# Patient Record
Sex: FEMALE | Race: OTHER | HISPANIC OR LATINO
[De-identification: names, ages, dates, MRNs, and addresses within clinical notes are randomized per-mention and may not be internally consistent; named-entity substitution may affect disease eponyms.]

---

## 2020-01-20 PROBLEM — Z00.00 ENCOUNTER FOR PREVENTIVE HEALTH EXAMINATION: Status: ACTIVE | Noted: 2020-01-20

## 2020-01-22 ENCOUNTER — APPOINTMENT (OUTPATIENT)
Dept: ANTEPARTUM | Facility: CLINIC | Age: 30
End: 2020-01-22

## 2020-03-09 ENCOUNTER — ASOB RESULT (OUTPATIENT)
Age: 30
End: 2020-03-09

## 2020-03-09 ENCOUNTER — APPOINTMENT (OUTPATIENT)
Dept: ANTEPARTUM | Facility: CLINIC | Age: 30
End: 2020-03-09
Payer: MEDICAID

## 2020-03-09 PROCEDURE — 76820 UMBILICAL ARTERY ECHO: CPT

## 2020-03-09 PROCEDURE — 76816 OB US FOLLOW-UP PER FETUS: CPT

## 2020-03-09 PROCEDURE — 93976 VASCULAR STUDY: CPT

## 2020-03-16 ENCOUNTER — APPOINTMENT (OUTPATIENT)
Dept: ANTEPARTUM | Facility: CLINIC | Age: 30
End: 2020-03-16
Payer: MEDICAID

## 2020-03-16 ENCOUNTER — ASOB RESULT (OUTPATIENT)
Age: 30
End: 2020-03-16

## 2020-03-16 PROCEDURE — 76820 UMBILICAL ARTERY ECHO: CPT

## 2020-03-16 PROCEDURE — 93976 VASCULAR STUDY: CPT

## 2020-03-16 PROCEDURE — 76821 MIDDLE CEREBRAL ARTERY ECHO: CPT

## 2020-03-16 PROCEDURE — 76818 FETAL BIOPHYS PROFILE W/NST: CPT

## 2020-03-23 ENCOUNTER — APPOINTMENT (OUTPATIENT)
Dept: ANTEPARTUM | Facility: CLINIC | Age: 30
End: 2020-03-23
Payer: MEDICAID

## 2020-03-23 ENCOUNTER — ASOB RESULT (OUTPATIENT)
Age: 30
End: 2020-03-23

## 2020-03-23 PROCEDURE — 76819 FETAL BIOPHYS PROFIL W/O NST: CPT

## 2020-03-23 PROCEDURE — 93976 VASCULAR STUDY: CPT

## 2020-03-23 PROCEDURE — 76821 MIDDLE CEREBRAL ARTERY ECHO: CPT

## 2020-03-23 PROCEDURE — 76816 OB US FOLLOW-UP PER FETUS: CPT

## 2020-03-23 PROCEDURE — 76820 UMBILICAL ARTERY ECHO: CPT

## 2020-03-30 ENCOUNTER — ASOB RESULT (OUTPATIENT)
Age: 30
End: 2020-03-30

## 2020-03-30 ENCOUNTER — APPOINTMENT (OUTPATIENT)
Dept: ANTEPARTUM | Facility: CLINIC | Age: 30
End: 2020-03-30
Payer: MEDICAID

## 2020-03-30 DIAGNOSIS — Z34.90 ENCOUNTER FOR SUPERVISION OF NORMAL PREGNANCY, UNSPECIFIED, UNSPECIFIED TRIMESTER: ICD-10-CM

## 2020-03-30 PROCEDURE — 76820 UMBILICAL ARTERY ECHO: CPT

## 2020-03-30 PROCEDURE — 93976 VASCULAR STUDY: CPT

## 2020-03-30 PROCEDURE — 76819 FETAL BIOPHYS PROFIL W/O NST: CPT

## 2020-03-30 PROCEDURE — 76821 MIDDLE CEREBRAL ARTERY ECHO: CPT

## 2020-04-02 ENCOUNTER — APPOINTMENT (OUTPATIENT)
Dept: ANTEPARTUM | Facility: CLINIC | Age: 30
End: 2020-04-02

## 2020-04-06 ENCOUNTER — APPOINTMENT (OUTPATIENT)
Dept: ANTEPARTUM | Facility: CLINIC | Age: 30
End: 2020-04-06
Payer: MEDICAID

## 2020-04-06 ENCOUNTER — ASOB RESULT (OUTPATIENT)
Age: 30
End: 2020-04-06

## 2020-04-06 VITALS — TEMPERATURE: 98.8 F

## 2020-04-06 PROCEDURE — 76819 FETAL BIOPHYS PROFIL W/O NST: CPT

## 2020-04-06 PROCEDURE — 76820 UMBILICAL ARTERY ECHO: CPT

## 2020-04-06 PROCEDURE — 76821 MIDDLE CEREBRAL ARTERY ECHO: CPT

## 2020-04-06 PROCEDURE — 93976 VASCULAR STUDY: CPT

## 2020-04-06 PROCEDURE — 76816 OB US FOLLOW-UP PER FETUS: CPT

## 2020-04-09 ENCOUNTER — APPOINTMENT (OUTPATIENT)
Dept: ANTEPARTUM | Facility: CLINIC | Age: 30
End: 2020-04-09

## 2020-04-13 ENCOUNTER — APPOINTMENT (OUTPATIENT)
Dept: ANTEPARTUM | Facility: CLINIC | Age: 30
End: 2020-04-13

## 2020-04-13 ENCOUNTER — OUTPATIENT (OUTPATIENT)
Dept: INPATIENT UNIT | Facility: HOSPITAL | Age: 30
LOS: 1 days | End: 2020-04-13
Payer: COMMERCIAL

## 2020-04-13 VITALS
SYSTOLIC BLOOD PRESSURE: 121 MMHG | TEMPERATURE: 99 F | OXYGEN SATURATION: 97 % | HEART RATE: 98 BPM | RESPIRATION RATE: 22 BRPM | DIASTOLIC BLOOD PRESSURE: 73 MMHG

## 2020-04-13 VITALS
TEMPERATURE: 98 F | DIASTOLIC BLOOD PRESSURE: 79 MMHG | SYSTOLIC BLOOD PRESSURE: 113 MMHG | HEART RATE: 107 BPM | RESPIRATION RATE: 21 BRPM

## 2020-04-13 DIAGNOSIS — O47.03 FALSE LABOR BEFORE 37 COMPLETED WEEKS OF GESTATION, THIRD TRIMESTER: ICD-10-CM

## 2020-04-13 LAB — SARS-COV-2 RNA SPEC QL NAA+PROBE: DETECTED

## 2020-04-13 PROCEDURE — 59025 FETAL NON-STRESS TEST: CPT

## 2020-04-13 PROCEDURE — G0463: CPT

## 2020-04-13 PROCEDURE — 59050 FETAL MONITOR W/REPORT: CPT

## 2020-04-13 PROCEDURE — 87635 SARS-COV-2 COVID-19 AMP PRB: CPT

## 2020-04-13 RX ORDER — LABETALOL HCL 100 MG
100 TABLET ORAL ONCE
Refills: 0 | Status: DISCONTINUED | OUTPATIENT
Start: 2020-04-13 | End: 2020-04-13

## 2020-04-13 NOTE — OB PROVIDER TRIAGE NOTE - NSOBPROVIDERNOTE_OBGYN_ALL_OB_FT
31yo  at 32w6d by lmp c/w 2nd trim sono.  edc 20.  hx of iugr  referred by fetal surveillance unit for cough x 3 - to have covid testing    refers cough with phlegm for 3 days.  denies chills, fever or sick contact    Exam  ICU Vital Signs Last 24 Hrs  T(C): 37.2 (2020 17:45), Max: 37.2 (2020 17:45)  T(F): 99 (2020 17:45), Max: 99 (2020 17:45)  HR: 107 (2020 18:32) (98 - 107)  BP: 113/79 (2020 18:32) (113/79 - 121/73)  BP(mean): --  ABP: --  ABP(mean): --  RR: 22 (2020 17:45) (22 - 22)  SpO2: --    chest clear bilaterally  nst reactive  no contractions    A; r/o covid vs other resp viral illness    plan  discharge with supportive rx  covid testing done  will discharge and notify of results  understood precautions

## 2020-04-13 NOTE — OB RN TRIAGE NOTE - NS_TRIAGEADDITIONAL COMMENTS_OBGYN_ALL_OB_FT
o2 sat 97 RR -21 chest eval done by dr Han home with instructions given by dr han in Sinhala verbalized understnding

## 2020-04-16 ENCOUNTER — APPOINTMENT (OUTPATIENT)
Dept: ANTEPARTUM | Facility: CLINIC | Age: 30
End: 2020-04-16

## 2020-04-20 ENCOUNTER — APPOINTMENT (OUTPATIENT)
Dept: ANTEPARTUM | Facility: CLINIC | Age: 30
End: 2020-04-20

## 2020-04-23 ENCOUNTER — APPOINTMENT (OUTPATIENT)
Dept: ANTEPARTUM | Facility: CLINIC | Age: 30
End: 2020-04-23

## 2020-04-27 ENCOUNTER — APPOINTMENT (OUTPATIENT)
Dept: ANTEPARTUM | Facility: CLINIC | Age: 30
End: 2020-04-27

## 2020-04-30 ENCOUNTER — APPOINTMENT (OUTPATIENT)
Dept: ANTEPARTUM | Facility: CLINIC | Age: 30
End: 2020-04-30

## 2020-05-04 ENCOUNTER — APPOINTMENT (OUTPATIENT)
Dept: ANTEPARTUM | Facility: CLINIC | Age: 30
End: 2020-05-04

## 2020-05-07 ENCOUNTER — APPOINTMENT (OUTPATIENT)
Dept: ANTEPARTUM | Facility: CLINIC | Age: 30
End: 2020-05-07

## 2020-05-11 ENCOUNTER — APPOINTMENT (OUTPATIENT)
Dept: ANTEPARTUM | Facility: CLINIC | Age: 30
End: 2020-05-11
Payer: MEDICAID

## 2020-05-11 ENCOUNTER — ASOB RESULT (OUTPATIENT)
Age: 30
End: 2020-05-11

## 2020-05-11 VITALS — TEMPERATURE: 98.7 F

## 2020-05-11 PROCEDURE — 76821 MIDDLE CEREBRAL ARTERY ECHO: CPT

## 2020-05-11 PROCEDURE — 93976 VASCULAR STUDY: CPT

## 2020-05-11 PROCEDURE — 76820 UMBILICAL ARTERY ECHO: CPT

## 2020-05-11 PROCEDURE — 76816 OB US FOLLOW-UP PER FETUS: CPT

## 2020-05-11 PROCEDURE — 76819 FETAL BIOPHYS PROFIL W/O NST: CPT

## 2020-05-14 ENCOUNTER — APPOINTMENT (OUTPATIENT)
Dept: ANTEPARTUM | Facility: CLINIC | Age: 30
End: 2020-05-14

## 2020-05-18 ENCOUNTER — APPOINTMENT (OUTPATIENT)
Dept: ANTEPARTUM | Facility: CLINIC | Age: 30
End: 2020-05-18

## 2020-05-19 ENCOUNTER — APPOINTMENT (OUTPATIENT)
Dept: ANTEPARTUM | Facility: CLINIC | Age: 30
End: 2020-05-19
Payer: MEDICAID

## 2020-05-19 ENCOUNTER — ASOB RESULT (OUTPATIENT)
Age: 30
End: 2020-05-19

## 2020-05-19 PROCEDURE — 76821 MIDDLE CEREBRAL ARTERY ECHO: CPT | Mod: 59

## 2020-05-19 PROCEDURE — 76818 FETAL BIOPHYS PROFILE W/NST: CPT

## 2020-05-19 PROCEDURE — 76820 UMBILICAL ARTERY ECHO: CPT

## 2020-05-19 PROCEDURE — 93976 VASCULAR STUDY: CPT

## 2020-05-21 ENCOUNTER — APPOINTMENT (OUTPATIENT)
Dept: ANTEPARTUM | Facility: CLINIC | Age: 30
End: 2020-05-21

## 2020-05-22 ENCOUNTER — ASOB RESULT (OUTPATIENT)
Age: 30
End: 2020-05-22

## 2020-05-22 ENCOUNTER — APPOINTMENT (OUTPATIENT)
Dept: ANTEPARTUM | Facility: CLINIC | Age: 30
End: 2020-05-22
Payer: MEDICAID

## 2020-05-22 PROCEDURE — 76819 FETAL BIOPHYS PROFIL W/O NST: CPT

## 2020-05-26 ENCOUNTER — APPOINTMENT (OUTPATIENT)
Dept: ANTEPARTUM | Facility: CLINIC | Age: 30
End: 2020-05-26
Payer: MEDICAID

## 2020-05-26 ENCOUNTER — INPATIENT (INPATIENT)
Facility: HOSPITAL | Age: 30
LOS: 2 days | Discharge: HOME | End: 2020-05-29
Attending: OBSTETRICS & GYNECOLOGY | Admitting: OBSTETRICS & GYNECOLOGY
Payer: COMMERCIAL

## 2020-05-26 ENCOUNTER — ASOB RESULT (OUTPATIENT)
Age: 30
End: 2020-05-26

## 2020-05-26 VITALS
HEART RATE: 76 BPM | RESPIRATION RATE: 16 BRPM | SYSTOLIC BLOOD PRESSURE: 110 MMHG | DIASTOLIC BLOOD PRESSURE: 67 MMHG | HEIGHT: 64.57 IN | WEIGHT: 233.91 LBS | TEMPERATURE: 98 F

## 2020-05-26 DIAGNOSIS — O47.1 FALSE LABOR AT OR AFTER 37 COMPLETED WEEKS OF GESTATION: ICD-10-CM

## 2020-05-26 LAB
ABO RH CONFIRMATION: SIGNIFICANT CHANGE UP
BASOPHILS # BLD AUTO: 0.02 K/UL — SIGNIFICANT CHANGE UP (ref 0–0.2)
BASOPHILS NFR BLD AUTO: 0.2 % — SIGNIFICANT CHANGE UP (ref 0–2)
BLD GP AB SCN SERPL QL: SIGNIFICANT CHANGE UP
EOSINOPHIL # BLD AUTO: 0.11 K/UL — SIGNIFICANT CHANGE UP (ref 0–0.5)
EOSINOPHIL NFR BLD AUTO: 1.1 % — SIGNIFICANT CHANGE UP (ref 0–6)
HCT VFR BLD CALC: 35.5 % — SIGNIFICANT CHANGE UP (ref 34.5–45)
HGB BLD-MCNC: 11.6 G/DL — SIGNIFICANT CHANGE UP (ref 11.5–15.5)
IMM GRANULOCYTES NFR BLD AUTO: 0.3 % — SIGNIFICANT CHANGE UP (ref 0–1.5)
LYMPHOCYTES # BLD AUTO: 2.34 K/UL — SIGNIFICANT CHANGE UP (ref 1–3.3)
LYMPHOCYTES # BLD AUTO: 22.5 % — SIGNIFICANT CHANGE UP (ref 13–44)
MCHC RBC-ENTMCNC: 27.7 PG — SIGNIFICANT CHANGE UP (ref 27–34)
MCHC RBC-ENTMCNC: 32.7 GM/DL — SIGNIFICANT CHANGE UP (ref 32–36)
MCV RBC AUTO: 84.7 FL — SIGNIFICANT CHANGE UP (ref 80–100)
MONOCYTES # BLD AUTO: 0.55 K/UL — SIGNIFICANT CHANGE UP (ref 0–0.9)
MONOCYTES NFR BLD AUTO: 5.3 % — SIGNIFICANT CHANGE UP (ref 2–14)
NEUTROPHILS # BLD AUTO: 7.34 K/UL — SIGNIFICANT CHANGE UP (ref 1.8–7.4)
NEUTROPHILS NFR BLD AUTO: 70.6 % — SIGNIFICANT CHANGE UP (ref 43–77)
PLATELET # BLD AUTO: 257 K/UL — SIGNIFICANT CHANGE UP (ref 150–400)
RBC # BLD: 4.19 M/UL — SIGNIFICANT CHANGE UP (ref 3.8–5.2)
RBC # FLD: 14.1 % — SIGNIFICANT CHANGE UP (ref 10.3–14.5)
SARS-COV-2 RNA SPEC QL NAA+PROBE: SIGNIFICANT CHANGE UP
WBC # BLD: 10.39 K/UL — SIGNIFICANT CHANGE UP (ref 3.8–10.5)
WBC # FLD AUTO: 10.39 K/UL — SIGNIFICANT CHANGE UP (ref 3.8–10.5)

## 2020-05-26 PROCEDURE — 76819 FETAL BIOPHYS PROFIL W/O NST: CPT

## 2020-05-26 PROCEDURE — 93976 VASCULAR STUDY: CPT

## 2020-05-26 PROCEDURE — 76820 UMBILICAL ARTERY ECHO: CPT

## 2020-05-26 PROCEDURE — 76816 OB US FOLLOW-UP PER FETUS: CPT

## 2020-05-26 PROCEDURE — 76821 MIDDLE CEREBRAL ARTERY ECHO: CPT

## 2020-05-26 RX ORDER — SODIUM CHLORIDE 9 MG/ML
1000 INJECTION, SOLUTION INTRAVENOUS
Refills: 0 | Status: DISCONTINUED | OUTPATIENT
Start: 2020-05-26 | End: 2020-05-27

## 2020-05-26 RX ORDER — CITRIC ACID/SODIUM CITRATE 300-500 MG
30 SOLUTION, ORAL ORAL ONCE
Refills: 0 | Status: COMPLETED | OUTPATIENT
Start: 2020-05-26 | End: 2020-05-27

## 2020-05-26 RX ORDER — OXYTOCIN 10 UNIT/ML
333.33 VIAL (ML) INJECTION
Qty: 20 | Refills: 0 | Status: COMPLETED | OUTPATIENT
Start: 2020-05-26 | End: 2020-05-27

## 2020-05-26 RX ADMIN — SODIUM CHLORIDE 125 MILLILITER(S): 9 INJECTION, SOLUTION INTRAVENOUS at 21:31

## 2020-05-26 NOTE — OB RN PATIENT PROFILE - LANGUAGE ASSISTANCE NEEDED
Phone call to patient to convey results.   Left message for a call back    ROD Zhong, RN/Yes-Patient/Caregiver accepts free interpretation services...

## 2020-05-26 NOTE — OB PROVIDER H&P - NSHPREVIEWOFSYSTEMS_GEN_ALL_CORE
Constitutional: no fever, sweats, and no chills.  Eyes: no pain, no redness, and no visual changes.  ENMT: no nasal congestion/drainage, no dysphagia, and no throat pain, no neck pain, no stiffness  CV: no chest pain, no edema.  Resp: no cough, no dyspnea  GI: no abdominal pain, no bloating, no constipation, no diarrhea, no nausea and no vomiting.  : no dysuria, no hematuria  MSK: no msk pain, no weakness  Skin: no lesions, and no rashes.  Neuro: no headache, no weakness.

## 2020-05-26 NOTE — OB PROVIDER H&P - NSHPPHYSICALEXAM_GEN_ALL_CORE
General: Alert and oriented x3, no acute distress  HEENT: normocephalic, atraumatic, MMM  Heart: normal rate, no edema, not cyanotic  Lungs: nonlabored respirations, no use of accessory muscles  Abdomen: soft, gravid, nontender, no rebound tenderness, no guarding, nonperitonitic  Pelvic: 2cm dilation/50% effacement/-2 stage  FHT: baseline  bpm, moderate variability, +accels, -deccels  [place]: no contractions  Ultrasound: vertex presentation, anterior placenta

## 2020-05-26 NOTE — OB PROVIDER H&P - ATTENDING COMMENTS
30 y.o. F  39wk presenting for IOL for severe IUGR <1%ile  sent from [facility] office today  GBS neg   fetal tracing cat 1  start IOL   consent obtained   high risk for Hemorrhage

## 2020-05-26 NOTE — OB PROVIDER H&P - HISTORY OF PRESENT ILLNESS
Pt is a 30 y.o. F  39wk presenting from clinic for IUGR. The pt states she was referred because her infant was below the 1st percentile for weight. Normal fetal movements noted by mother, no contractions. Denies vaginal bleeding or leakage of fluids. Denies abdominal pain, nausea, vomiting, headache, fever, sweats, chills, chest pain, shortness of breath. Follows with [facility] clinic. Rubella nonimmune, COVID+ dx 6 weeks ago and symptoms resolved 4 weeks ago, GBS-.     PMH: None  PSH: None  Meds: PNV  Allergies: NKDA  SH: Denies smoking, drinking, or illicit/recreational drug use, lives with brother, mother, and children, feels safe at home.

## 2020-05-26 NOTE — CHART NOTE - NSCHARTNOTEFT_GEN_A_CORE
05-26-20 @ 23:03  Patient was evaluated at bedside.  Patient resting comfortably. No acute complaints at this time.    ICU Vital Signs Last 24 Hrs  T(C): 36.8 (26 May 2020 20:58), Max: 36.8 (26 May 2020 20:50)  T(F): 98.24 (26 May 2020 20:58), Max: 98.24 (26 May 2020 20:58)  HR: 76 (26 May 2020 20:59) (76 - 76)  BP: 110/67 (26 May 2020 20:59) (110/67 - 110/67)  RR: 16 (26 May 2020 20:50) (16 - 16)      FHT: 135 bpm, moderate variability + accels no decels noted - category I tracing  [place]: No contractions noted  SVE: 2.0/50/-2      PLAN:  Reactive tracing. No contractions noted on toco  1 dose of vaginal cytotec administered. Patient tolerated procedure well   Continuous FHT/[place]  Maternal/fetal status reassuring  Will continue to reassess prn.

## 2020-05-26 NOTE — OB PROVIDER H&P - ASSESSMENT
Pt is a 30 y.o. F  39wk presenting for IOL. Referred from clinic for IUGR, <1st percentile for weight. Rubella nonimmune, COVID+ 6 weeks ago but asymptomatic, GBS-.     Plan    - Admission labs: CBC, type and screen, ABO confirmation, RPR, COVID  - IV fluids  - Pain management: consider epidural  - Continuous FHT and Tocometry  - Plan for induction      d/w Dr. Bains

## 2020-05-27 ENCOUNTER — TRANSCRIPTION ENCOUNTER (OUTPATIENT)
Age: 30
End: 2020-05-27

## 2020-05-27 ENCOUNTER — RESULT REVIEW (OUTPATIENT)
Age: 30
End: 2020-05-27

## 2020-05-27 LAB — T PALLIDUM AB TITR SER: NEGATIVE — SIGNIFICANT CHANGE UP

## 2020-05-27 PROCEDURE — 88307 TISSUE EXAM BY PATHOLOGIST: CPT | Mod: 26

## 2020-05-27 RX ORDER — IBUPROFEN 200 MG
1 TABLET ORAL
Qty: 30 | Refills: 0
Start: 2020-05-27

## 2020-05-27 RX ORDER — ACETAMINOPHEN 500 MG
975 TABLET ORAL
Refills: 0 | Status: DISCONTINUED | OUTPATIENT
Start: 2020-05-27 | End: 2020-05-29

## 2020-05-27 RX ORDER — BENZOCAINE 10 %
1 GEL (GRAM) MUCOUS MEMBRANE EVERY 6 HOURS
Refills: 0 | Status: DISCONTINUED | OUTPATIENT
Start: 2020-05-27 | End: 2020-05-29

## 2020-05-27 RX ORDER — SIMETHICONE 80 MG/1
1 TABLET, CHEWABLE ORAL
Qty: 0 | Refills: 0 | DISCHARGE
Start: 2020-05-27

## 2020-05-27 RX ORDER — KETOROLAC TROMETHAMINE 30 MG/ML
30 SYRINGE (ML) INJECTION ONCE
Refills: 0 | Status: DISCONTINUED | OUTPATIENT
Start: 2020-05-27 | End: 2020-05-27

## 2020-05-27 RX ORDER — DIBUCAINE 1 %
1 OINTMENT (GRAM) RECTAL EVERY 6 HOURS
Refills: 0 | Status: DISCONTINUED | OUTPATIENT
Start: 2020-05-27 | End: 2020-05-29

## 2020-05-27 RX ORDER — ACETAMINOPHEN 500 MG
1 TABLET ORAL
Qty: 30 | Refills: 0
Start: 2020-05-27 | End: 2020-06-05

## 2020-05-27 RX ORDER — PRAMOXINE HYDROCHLORIDE 150 MG/15G
1 AEROSOL, FOAM RECTAL EVERY 4 HOURS
Refills: 0 | Status: DISCONTINUED | OUTPATIENT
Start: 2020-05-27 | End: 2020-05-29

## 2020-05-27 RX ORDER — HYDROCORTISONE 1 %
1 OINTMENT (GRAM) TOPICAL EVERY 6 HOURS
Refills: 0 | Status: DISCONTINUED | OUTPATIENT
Start: 2020-05-27 | End: 2020-05-29

## 2020-05-27 RX ORDER — OXYTOCIN 10 UNIT/ML
333.33 VIAL (ML) INJECTION
Qty: 20 | Refills: 0 | Status: DISCONTINUED | OUTPATIENT
Start: 2020-05-27 | End: 2020-05-29

## 2020-05-27 RX ORDER — OXYCODONE HYDROCHLORIDE 5 MG/1
5 TABLET ORAL ONCE
Refills: 0 | Status: DISCONTINUED | OUTPATIENT
Start: 2020-05-27 | End: 2020-05-29

## 2020-05-27 RX ORDER — SIMETHICONE 80 MG/1
80 TABLET, CHEWABLE ORAL EVERY 4 HOURS
Refills: 0 | Status: DISCONTINUED | OUTPATIENT
Start: 2020-05-27 | End: 2020-05-29

## 2020-05-27 RX ORDER — POLYETHYLENE GLYCOL 3350 17 G/17G
17 POWDER, FOR SOLUTION ORAL
Qty: 119 | Refills: 0
Start: 2020-05-27 | End: 2020-06-02

## 2020-05-27 RX ORDER — SODIUM CHLORIDE 9 MG/ML
3 INJECTION INTRAMUSCULAR; INTRAVENOUS; SUBCUTANEOUS EVERY 8 HOURS
Refills: 0 | Status: DISCONTINUED | OUTPATIENT
Start: 2020-05-27 | End: 2020-05-29

## 2020-05-27 RX ORDER — LANOLIN
1 OINTMENT (GRAM) TOPICAL EVERY 6 HOURS
Refills: 0 | Status: DISCONTINUED | OUTPATIENT
Start: 2020-05-27 | End: 2020-05-29

## 2020-05-27 RX ORDER — FERROUS SULFATE 325(65) MG
1 TABLET ORAL
Qty: 60 | Refills: 0
Start: 2020-05-27 | End: 2020-06-25

## 2020-05-27 RX ORDER — OXYCODONE HYDROCHLORIDE 5 MG/1
5 TABLET ORAL
Refills: 0 | Status: DISCONTINUED | OUTPATIENT
Start: 2020-05-27 | End: 2020-05-29

## 2020-05-27 RX ORDER — DIPHENHYDRAMINE HCL 50 MG
25 CAPSULE ORAL EVERY 6 HOURS
Refills: 0 | Status: DISCONTINUED | OUTPATIENT
Start: 2020-05-27 | End: 2020-05-29

## 2020-05-27 RX ORDER — IBUPROFEN 200 MG
600 TABLET ORAL EVERY 6 HOURS
Refills: 0 | Status: DISCONTINUED | OUTPATIENT
Start: 2020-05-27 | End: 2020-05-29

## 2020-05-27 RX ORDER — MAGNESIUM HYDROXIDE 400 MG/1
30 TABLET, CHEWABLE ORAL
Refills: 0 | Status: DISCONTINUED | OUTPATIENT
Start: 2020-05-27 | End: 2020-05-29

## 2020-05-27 RX ORDER — AER TRAVELER 0.5 G/1
1 SOLUTION RECTAL; TOPICAL EVERY 4 HOURS
Refills: 0 | Status: DISCONTINUED | OUTPATIENT
Start: 2020-05-27 | End: 2020-05-29

## 2020-05-27 RX ORDER — IBUPROFEN 200 MG
600 TABLET ORAL EVERY 6 HOURS
Refills: 0 | Status: COMPLETED | OUTPATIENT
Start: 2020-05-27 | End: 2021-04-25

## 2020-05-27 RX ORDER — TETANUS TOXOID, REDUCED DIPHTHERIA TOXOID AND ACELLULAR PERTUSSIS VACCINE, ADSORBED 5; 2.5; 8; 8; 2.5 [IU]/.5ML; [IU]/.5ML; UG/.5ML; UG/.5ML; UG/.5ML
0.5 SUSPENSION INTRAMUSCULAR ONCE
Refills: 0 | Status: DISCONTINUED | OUTPATIENT
Start: 2020-05-27 | End: 2020-05-29

## 2020-05-27 RX ORDER — OXYTOCIN 10 UNIT/ML
2 VIAL (ML) INJECTION
Qty: 30 | Refills: 0 | Status: DISCONTINUED | OUTPATIENT
Start: 2020-05-27 | End: 2020-05-29

## 2020-05-27 RX ADMIN — Medication 1000 MILLIUNIT(S)/MIN: at 13:04

## 2020-05-27 RX ADMIN — SODIUM CHLORIDE 125 MILLILITER(S): 9 INJECTION, SOLUTION INTRAVENOUS at 06:10

## 2020-05-27 RX ADMIN — Medication 600 MILLIGRAM(S): at 18:32

## 2020-05-27 RX ADMIN — Medication 30 MILLIGRAM(S): at 14:22

## 2020-05-27 RX ADMIN — Medication 2 MILLIUNIT(S)/MIN: at 07:49

## 2020-05-27 RX ADMIN — Medication 30 MILLILITER(S): at 12:09

## 2020-05-27 NOTE — OB RN DELIVERY SUMMARY - NS_SEPSISRSKCALC_OBGYN_ALL_OB_FT
EOS calculated successfully. EOS Risk Factor: 0.5/1000 live births ([facility] national incidence); GA=39w1d; Temp=98.78; ROM=0.117; GBS='Negative'; Antibiotics='No antibiotics or any antibiotics < 2 hrs prior to birth'

## 2020-05-27 NOTE — OB RN DELIVERY SUMMARY - BABY A: APGAR 5 MIN COLOR, DELIVERY
[General Appearance - Well Developed] : well developed [Normal Appearance] : normal appearance [Well Groomed] : well groomed [General Appearance - Well Nourished] : well nourished [General Appearance - In No Acute Distress] : no acute distress [No Deformities] : no deformities [Normal Conjunctiva] : the conjunctiva exhibited no abnormalities [Eyelids - No Xanthelasma] : the eyelids demonstrated no xanthelasmas [Normal Oral Mucosa] : normal oral mucosa [No Oral Pallor] : no oral pallor [No Oral Cyanosis] : no oral cyanosis [Heart Rate And Rhythm] : heart rate and rhythm were normal [Heart Sounds] : normal S1 and S2 [Murmurs] : no murmurs present [Arterial Pulses Normal] : the arterial pulses were normal [Edema] : no peripheral edema present [Exaggerated Use Of Accessory Muscles For Inspiration] : no accessory muscle use [Respiration, Rhythm And Depth] : normal respiratory rhythm and effort [Auscultation Breath Sounds / Voice Sounds] : lungs were clear to auscultation bilaterally [Abdomen Soft] : soft [Abdomen Tenderness] : non-tender [Abdomen Mass (___ Cm)] : no abdominal mass palpated [Abnormal Walk] : normal gait [Gait - Sufficient For Exercise Testing] : the gait was sufficient for exercise testing [Cyanosis, Localized] : no localized cyanosis [Nail Clubbing] : no clubbing of the fingernails [Petechial Hemorrhages (___cm)] : no petechial hemorrhages [] : no ischemic changes [Impaired Insight] : insight and judgment were intact [Oriented To Time, Place, And Person] : oriented to person, place, and time [Affect] : the affect was normal [Mood] : the mood was normal [No Anxiety] : not feeling anxious [FreeTextEntry1] : no JVD or bruits  (1) body pink, extremities blue

## 2020-05-27 NOTE — CHART NOTE - NSCHARTNOTEFT_GEN_A_CORE
I have reviewed patient's medical and obstetrical hx  Multip with IUP @ 85clj1p with FGR 1%  Cat 2 tracing minimal to moderate variability, 10x10 accels, no decels  [place]: irregular  -continue current mgmt  -start pitocin  ppalos

## 2020-05-27 NOTE — DISCHARGE NOTE OB - MEDICATION SUMMARY - MEDICATIONS TO TAKE
I will START or STAY ON the medications listed below when I get home from the hospital:    acetaminophen 650 mg oral tablet, extended release  -- 1 tab(s) by mouth every 8 hours MDD:4  -- This product contains acetaminophen.  Do not use  with any other product containing acetaminophen to prevent possible liver damage.    -- Indication: For PAIN    ibuprofen 600 mg oral tablet  -- 1 tab(s) by mouth every 6 hours MDD:4  -- Do not take this drug if you are pregnant.  It is very important that you take or use this exactly as directed.  Do not skip doses or discontinue unless directed by your doctor.  May cause drowsiness or dizziness.  Obtain medical advice before taking any non-prescription drugs as some may affect the action of this medication.  Take with food or milk.    -- Indication: For PAIN    ferrous sulfate 325 mg (65 mg elemental iron) oral delayed release tablet  -- 1 tab(s) by mouth 2 times a day   -- May discolor urine or feces.  Swallow whole.  Do not crush.    -- Indication: For ANEMIA    Prena1 oral capsule  -- 1 cap(s) by mouth once a day  -- Indication: For GENERAL HEALTH    MiraLax oral powder for reconstitution  -- 17 gram(s) by mouth once a day   -- Dilute this medication with liquid before administration.  It is very important that you take or use this exactly as directed.  Do not skip doses or discontinue unless directed by your doctor.    -- Indication: For CONSTIPATION    simethicone 80 mg oral tablet, chewable  -- 1 tab(s) by mouth every 4 hours, As needed, Gas  -- Indication: For GAS RELIEF

## 2020-05-27 NOTE — CHART NOTE - NSCHARTNOTEFT_GEN_A_CORE
05-27-20 @ 03:44  Patient was evaluated at bedside.  Patient reporting mild abdominal pain with contractions; however, does not want epidural at this time.    ICU Vital Signs Last 24 Hrs  T(C): 36.6 (27 May 2020 00:19), Max: 36.8 (26 May 2020 20:50)  T(F): 97.88 (27 May 2020 00:19), Max: 98.24 (26 May 2020 20:58)  HR: 68 (27 May 2020 03:38) (68 - 76)  BP: 112/67 (27 May 2020 03:38) (109/72 - 112/67)  RR: 16 (26 May 2020 20:50) (16 - 16)      FHT: 135 bpm,  - category I tracing  [place]: Irregular  SVE: 2.0/60/-2      PLAN:  Reactive tracing with periods of minimal variability. Will provide 500cc bolus of LR  Cervical exam unchanged; will proceed with vaginal cyotec (now s/p 25mcg x2)  Continuous FHT/[place]  Maternal/fetal status reassuring  Will continue to reassess prn. 05-27-20 @ 03:44  Patient was evaluated at bedside.  Patient reporting mild abdominal pain with contractions; however, does not want epidural at this time.    ICU Vital Signs Last 24 Hrs  T(C): 36.6 (27 May 2020 00:19), Max: 36.8 (26 May 2020 20:50)  T(F): 97.88 (27 May 2020 00:19), Max: 98.24 (26 May 2020 20:58)  HR: 68 (27 May 2020 03:38) (68 - 76)  BP: 112/67 (27 May 2020 03:38) (109/72 - 112/67)  RR: 16 (26 May 2020 20:50) (16 - 16)      FHT: 135 bpm, moderate variability + accels no decels present  [place]: Irregular  SVE: 2.0/60/-2      PLAN:  Reactive tracing with periods of minimal variability. Will provide 500cc bolus of LR  Cervical exam unchanged; will proceed with vaginal cyotec (now s/p 25mcg x2)  Continuous FHT/[place]  Maternal/fetal status reassuring  Will continue to reassess prn.

## 2020-05-27 NOTE — DISCHARGE NOTE OB - PATIENT PORTAL LINK FT
You can access the FollowMyHealth Patient Portal offered by [facility] by registering at the following website: [email or web address]. By joining [facility] [employer]’s FollowMyHealth portal, you will also be able to view your health information using other applications (apps) compatible with our system.

## 2020-05-27 NOTE — DISCHARGE NOTE OB - HOSPITAL COURSE
She is a 31 yo now  who presented at 39wks GA for an induction of labor for fetal growth restriction (1%-tile) and had a normal delivery. She had a normal postpartum course and was discharged home in stable condition on postpartum day 1.

## 2020-05-27 NOTE — CHART NOTE - NSCHARTNOTEFT_GEN_A_CORE
Attending note    S: patient reports mild pain with contractions    O: VSS per mat flow sheet    FHT: Cat 2 for minima to moderate variability, 10x10 accels, no decels  [place]: unable to trace    pit: 8mu  VE: 4/50/-3    A/P: Multip with IUP @ 56hmu4p admitted for IOL due to FGR 1%. Maternal/fetal status reassuring    -continue current mgmt  -reassess in 1-2hrs or prn    ppalos

## 2020-05-27 NOTE — CHART NOTE - NSCHARTNOTEFT_GEN_A_CORE
Patient is having an iol for FGR.     SVE: 3/70/-2  FHT: baseline 140bpm, moderate variability, -accels, -deccels  [place]: no contractions    a/p  Patient is having an iol.   - d/c vaginal cytotec  - start pitocin

## 2020-05-27 NOTE — DISCHARGE NOTE OB - CARE PROVIDER_API CALL
Bushra Milligan  OBSTETRICS AND GYNECOLOGY  [address] [place] [address]  Phone: [phone]  Fax: [phone]  Follow Up Time:

## 2020-05-27 NOTE — DISCHARGE NOTE OB - CARE PLAN
Principal Discharge DX:	Normal delivery at term  Goal:	Rapid recovery  Assessment and plan of treatment:	Patient should transition to regular activity level. Resume regular diet. Patient should follow up with her OB for a postpartum checkup 6 weeks after delivery. Patient should call her doctor sooner if she develops a fever or uncontrolled vaginal bleeding or fevers. Please call sooner if there are any other concerns.

## 2020-05-27 NOTE — OB PROVIDER DELIVERY SUMMARY - NSPROVIDERDELIVERYNOTE_OBGYN_ALL_OB_FT
Vaginal Delivery Summary    Procedure: Normal spontaneous vaginal delivery   Findings: Viable female infant delivered in cephalic presentation at 1304, placenta delivered at 1308  Apgar scores 9/9   Weight 2085 g  Laceration(s): 1st degree perineal  Repair: 3-0 vicryl on SH  EBL: 150 cc  Complications: FGR infant,     Procedure:   Patient felt rectal pressure and was found to be fully dilated, +2 station. She pushed effectively for 5 minutes. She delivered a viable female infant. Delayed cord clamping was completed for 30 seconds. Placenta delivered intact and pitocin was started at the delivery of infant. Perineum and vagina were inspected, first degree laceration(s) present. Adequate hemostasis was obtained. Vaginal Delivery Summary    Procedure: Normal spontaneous vaginal delivery   Findings: Viable female infant delivered in cephalic presentation at 1304, placenta delivered at 1308  Apgar scores 9/9   Weight 2085 g  Laceration(s): 1st degree perineal  Repair: 3-0 vicryl on SH  EBL: 150 cc  Complications: FGR infant,     Procedure:   Patient felt rectal pressure and was found to be fully dilated, +2 station. She pushed effectively for 5 minutes. She delivered a viable female infant. Delayed cord clamping was completed for 30 seconds. Placenta delivered intact and pitocin was started at the delivery of infant. Perineum and vagina were inspected, first degree laceration(s) present. Adequate hemostasis was obtained.    agree with above  I was present throughout entire delivery  ppalos

## 2020-05-28 ENCOUNTER — APPOINTMENT (OUTPATIENT)
Dept: ANTEPARTUM | Facility: CLINIC | Age: 30
End: 2020-05-28

## 2020-05-28 DIAGNOSIS — Z29.9 ENCOUNTER FOR PROPHYLACTIC MEASURES, UNSPECIFIED: ICD-10-CM

## 2020-05-28 LAB
MEV IGG SER-ACNC: 35.6 AU/ML — SIGNIFICANT CHANGE UP
MEV IGG+IGM SER-IMP: POSITIVE — SIGNIFICANT CHANGE UP

## 2020-05-28 RX ADMIN — Medication 600 MILLIGRAM(S): at 00:01

## 2020-05-28 RX ADMIN — Medication 600 MILLIGRAM(S): at 19:08

## 2020-05-28 RX ADMIN — Medication 600 MILLIGRAM(S): at 05:46

## 2020-05-28 RX ADMIN — Medication 975 MILLIGRAM(S): at 16:29

## 2020-05-28 RX ADMIN — Medication 600 MILLIGRAM(S): at 11:58

## 2020-05-28 RX ADMIN — Medication 975 MILLIGRAM(S): at 09:14

## 2020-05-28 RX ADMIN — Medication 1 TABLET(S): at 11:58

## 2020-05-28 NOTE — PROGRESS NOTE ADULT - ASSESSMENT
Pt is a 30 y.o.  now PPD#1 s/p spontaneous vaginal delivery at 39+ weeks, viable female infant otherwise uncomplicated. COVID 19-, O+, Rubella nonimmune. FGR 1st percentile.

## 2020-05-28 NOTE — PROGRESS NOTE ADULT - PROBLEM SELECTOR PLAN 1
-Voiding, tolerating PO, bowel function nml   -Advance care as tolerated   -Continue routine postpartum/postoperative care and education.  -Meeting milestones. Will discuss discharge planning with attending.

## 2020-05-28 NOTE — PROGRESS NOTE ADULT - ATTENDING COMMENTS
Patient is 31 yo P1 PPD1 s/p . She was seen and examined at approx. 8:15 am. VSS afebrile, exam as above. I agree with above resident note. She is stable for discharge. Follow up in the office in 4 weeks  WIL Miller MD

## 2020-05-28 NOTE — PROGRESS NOTE ADULT - SUBJECTIVE AND OBJECTIVE BOX
Pt is a 30 y.o.  now PPD#1 s/p spontaneous vaginal delivery at 39+ weeks, viable female infant otherwise uncomplicated. COVID 19-, O+, Rubella nonimmune. FGR 1st percentile.     S:    The patient has no complaints.  Pain controlled with current medications.   She is ambulating without difficulty and tolerating PO   + flatus/-BM  Plans to breast and bottle feed baby.     O:    Vital Signs Last 24 Hrs  T(C): 36.9 (27 May 2020 20:57), Max: 37.1 (27 May 2020 11:00)  T(F): 98.4 (27 May 2020 20:57), Max: 98.78 (27 May 2020 11:00)  HR: 92 (27 May 2020 20:57) (58 - 97)  BP: 93/64 (27 May 2020 20:57) (93/64 - 139/63)  RR: 20 (27 May 2020 20:57) (18 - 20)  SpO2: 99% (27 May 2020 15:50) (97% - 100%)    Lungs: CTABL, nonlabored respirations  Cardiac: S1 and S2 noted, no murmurs/rubs/gallops  Abdomen:  soft, non-tender, non-distended, +bowel sounds.  Uterus:  Fundus firm below umbilicus  VE:  +lochia  Ext:  Non-tender.

## 2020-05-29 ENCOUNTER — APPOINTMENT (OUTPATIENT)
Dept: ANTEPARTUM | Facility: CLINIC | Age: 30
End: 2020-05-29

## 2020-05-29 ENCOUNTER — TRANSCRIPTION ENCOUNTER (OUTPATIENT)
Age: 30
End: 2020-05-29

## 2020-05-29 VITALS
DIASTOLIC BLOOD PRESSURE: 83 MMHG | HEART RATE: 85 BPM | RESPIRATION RATE: 20 BRPM | TEMPERATURE: 98 F | SYSTOLIC BLOOD PRESSURE: 121 MMHG

## 2020-05-29 PROCEDURE — 86765 RUBEOLA ANTIBODY: CPT

## 2020-05-29 PROCEDURE — T1013: CPT

## 2020-05-29 PROCEDURE — 36415 COLL VENOUS BLD VENIPUNCTURE: CPT

## 2020-05-29 PROCEDURE — 85027 COMPLETE CBC AUTOMATED: CPT

## 2020-05-29 PROCEDURE — 59050 FETAL MONITOR W/REPORT: CPT

## 2020-05-29 PROCEDURE — 86780 TREPONEMA PALLIDUM: CPT

## 2020-05-29 PROCEDURE — 86900 BLOOD TYPING SEROLOGIC ABO: CPT

## 2020-05-29 PROCEDURE — 86901 BLOOD TYPING SEROLOGIC RH(D): CPT

## 2020-05-29 PROCEDURE — G0463: CPT

## 2020-05-29 PROCEDURE — 87635 SARS-COV-2 COVID-19 AMP PRB: CPT

## 2020-05-29 PROCEDURE — 59025 FETAL NON-STRESS TEST: CPT

## 2020-05-29 PROCEDURE — 88307 TISSUE EXAM BY PATHOLOGIST: CPT

## 2020-05-29 PROCEDURE — 86850 RBC ANTIBODY SCREEN: CPT

## 2020-05-29 RX ADMIN — Medication 600 MILLIGRAM(S): at 12:00

## 2020-05-29 RX ADMIN — Medication 975 MILLIGRAM(S): at 10:30

## 2020-05-29 RX ADMIN — Medication 975 MILLIGRAM(S): at 15:05

## 2020-05-29 RX ADMIN — Medication 1 TABLET(S): at 12:00

## 2020-05-29 NOTE — PROGRESS NOTE ADULT - ASSESSMENT
Pt is a 30 y.o.  now PPD#2 s/p spontaneous vaginal delivery at 39+ weeks, viable female infant otherwise uncomplicated. COVID 19-, O+, Rubella nonimmune. FGR 1st percentile.

## 2020-05-29 NOTE — PROGRESS NOTE ADULT - ATTENDING COMMENTS
Patient seen and examined. Agree with resident note today. PPD2 s/p , infant in NICU. Rh+ / COVD - on admission (pos in April). VSS, exam appropriate. S/P MMR for RNI. DC home today.

## 2020-05-29 NOTE — DISCHARGE NOTE NURSING/CASE MANAGEMENT/SOCIAL WORK - PATIENT PORTAL LINK FT
Subjective:       Patient ID: Tanisha Cunningham is a 52 y.o. female.    Chief Complaint:  Well Woman      History of Present Illness  HPI  Annual Exam-Postmenopausal  Patient presents for annual exam. The patient has no complaints today. The patient is sexually active--no prob lubricating; libido ok; . GYN screening history: last pap: approximate date  and was normal and last mammogram: approximate date  and was normal. The patient is taking hormone replacement therapy--using estratest/prometrium. Patient denies post-menopausal vaginal bleeding. Patient reports post-menopausal vaginal bleeding.--reports missing prometrium for 1 day and reports started having spotting; still has noticed spotting;also having menstrual cramping; The patient wears seatbelts: yes. The patient participates in regular exercise: no.--limited by back pains; still tries to do walking tape;  Has the patient ever been transfused or tattooed?: no. The patient reports that there is not domestic violence in her life.    Still reports +hot flushes but not as bad;     occas bladder leakage with strong cough/sneeze/laugh    Reports no problems sleeping       GYN & OB History  Patient's last menstrual period was 2015 (approximate).   Date of Last Pap: 2017    OB History    Para Term  AB Living   2 2       2   SAB TAB Ectopic Multiple Live Births                  # Outcome Date GA Lbr [place]/2nd Weight Sex Delivery Anes PTL Lv   2 Para            1 Para                   Review of Systems  Review of Systems   Genitourinary: Positive for postmenopausal bleeding.   All other systems reviewed and are negative.          Objective:      Physical Exam:   Constitutional: She appears well-developed.     Eyes: Conjunctivae and EOM are normal. Pupils are equal, round, and reactive to light.    Neck: Normal range of motion. Neck supple.     Pulmonary/Chest: Effort normal. Right breast exhibits no mass, no nipple discharge, no  skin change and no tenderness. Left breast exhibits no mass, no nipple discharge, no skin change and no tenderness. Breasts are symmetrical.        Abdominal: Soft.     Genitourinary: Rectum normal and vagina normal. Pelvic exam was performed with patient supine. Uterus is enlarged and hosting fibroids. Cervix is normal. Right adnexum displays no mass and no tenderness. Left adnexum displays no mass and no tenderness. No erythema, bleeding, rectocele, cystocele or unspecified prolapse of vaginal walls in the vagina. No vaginal discharge (menstrual like blood in vagina) found. Labial bartholins normal.       Uterus Size: 12 cm   Musculoskeletal: Normal range of motion.       Neurological: She is alert.    Skin: Skin is warm.    Psychiatric: She has a normal mood and affect.           Assessment:        1. Encounter for gynecological examination (general) (routine) without abnormal findings    2. Symptomatic menopausal or female climacteric states    3. Decreased libido    4. Screening mammogram, encounter for    5. Screening for cervical cancer               Plan:      Continue annual well woman exam.  Pap 2017; due in 2020; Reviewed updated recommendations for pap smears (every 3 years) in low risk patients.   Recommend annual pelvic exams.  Reviewed recommendations for annual CBE.  Patient advised of need for sono or emb for pmb; pt prefers sono; aware if ut lining >4mm; will need emb  mammo ordered, continue yearly until age 73; plans to do at [facility]  rx sent for prometrium 100 bid ; and estratest hs rf x 5  encouraged diet, exercise, weight loss        You can access the FollowMyHealth Patient Portal offered by [facility] by registering at the following website: [email or web address]. By joining [employer]’s FollowMyHealth portal, you will also be able to view your health information using other applications (apps) compatible with our system.

## 2020-05-29 NOTE — PROGRESS NOTE ADULT - SUBJECTIVE AND OBJECTIVE BOX
Pt is a 30 y.o.  now PPD#2 s/p spontaneous vaginal delivery at 39+ weeks, viable female infant otherwise uncomplicated. COVID 19-, O+, Rubella nonimmune. FGR 1st percentile.     S:    The patient has no complaints.  Pain controlled with current medications.   She is ambulating without difficulty and tolerating PO   + flatus/+BM  Plans to breast and bottle feed baby.     O:    Vital Signs Last 24 Hrs  T(C): 36.7 (28 May 2020 19:53), Max: 36.7 (28 May 2020 19:53)  T(F): 98.1 (28 May 2020 19:53), Max: 98.1 (28 May 2020 19:53)  HR: 62 (28 May 2020 19:53) (59 - 62)  BP: 97/74 (28 May 2020 19:53) (97/74 - 104/70)  RR: 20 (28 May 2020 19:53) (20 - 20)    Lungs: CTABL, nonlabored respirations  Cardiac: S1 and S2 noted, no murmurs/rubs/gallops  Abdomen:  soft, non-tender, non-distended, +bowel sounds.  Uterus:  Fundus firm below umbilicus  VE:  +lochia  Ext:  Non-tender.

## 2021-11-15 ENCOUNTER — TRANSCRIPTION ENCOUNTER (OUTPATIENT)
Age: 31
End: 2021-11-15

## 2022-06-01 NOTE — OB RN PATIENT PROFILE - BREAST MILK SUPPORTS STABLE NEWBORN BLOOD SUGAR
Counseling Text: I recommended taking niacin or niacinamide, also know as vitamin B3, twice daily. Recent evidence suggests that taking vitamin B3 (500 mg twice daily) can reduce the risk of actinic keratoses and non-melanoma skin cancers. Side effects of vitamin B3 include flushing and headache.
Detail Level: Generalized
Statement Selected

## 2023-06-12 ENCOUNTER — ASOB RESULT (OUTPATIENT)
Age: 33
End: 2023-06-12

## 2023-06-12 ENCOUNTER — APPOINTMENT (OUTPATIENT)
Dept: ANTEPARTUM | Facility: CLINIC | Age: 33
End: 2023-06-12
Payer: MEDICAID

## 2023-06-12 PROCEDURE — 36415 COLL VENOUS BLD VENIPUNCTURE: CPT

## 2023-06-12 PROCEDURE — 76813 OB US NUCHAL MEAS 1 GEST: CPT

## 2023-06-14 NOTE — OB PROVIDER H&P - VDRL/RPR: DATE, OB PROFILE
Quality 130: Documentation Of Current Medications In The Medical Record: Current Medications Documented
Detail Level: Detailed
Quality 431: Preventive Care And Screening: Unhealthy Alcohol Use - Screening: Patient not identified as an unhealthy alcohol user when screened for unhealthy alcohol use using a systematic screening method
Quality 226: Preventive Care And Screening: Tobacco Use: Screening And Cessation Intervention: Patient screened for tobacco use and is an ex/non-smoker
15-René-2020

## 2023-06-15 LAB
ADDITIONAL US: NORMAL
COMMENTS: AFP: NORMAL
CRL SCAN TWIN B: NORMAL
CRL SCAN: NORMAL
CROWN RUMP LENGTH TWIN B: NORMAL
CROWN RUMP LENGTH: 63.2 MM
DOWN SYNDROME AGE RISK: NORMAL
DOWN SYNDROME INTERPRETATION: NORMAL
DOWN SYNDROME SCREENING RISK: NORMAL
GEST. AGE ON COLLECTION DATE: 12.6 WEEKS
HCG MOM: 0.92
HCG VALUE: 67.5 IU/ML
MATERNAL AGE AT EDD: 35.7 YR
NOTE: AFP: NORMAL
NT MOM TWIN B: NORMAL
NT TWIN B: NORMAL
NUCHAL TRANSLUCENCY (NT): 1.7 MM
NUCHAL TRANSLUCENCY MOM: 1.04
NUMBER OF FETUSES: 1
PAPP-A MOM: 0.38
PAPP-A VALUE: 224.9 NG/ML
RACE: NORMAL
RESULTS AFP: NORMAL
SONOGRAPHER ID#: NORMAL
SUBMIT PART 2 SAMPLE USING: NORMAL
TEST RESULTS: AFP: NORMAL
TRISOMY 18 AGE RISK: NORMAL
TRISOMY 18 INTERPRETATION: NORMAL
TRISOMY 18 SCREENING RISK: NORMAL
WEIGHT AFP: 239 LBS

## 2023-06-28 ENCOUNTER — NON-APPOINTMENT (OUTPATIENT)
Age: 33
End: 2023-06-28

## 2023-07-11 ENCOUNTER — APPOINTMENT (OUTPATIENT)
Dept: MATERNAL FETAL MEDICINE | Facility: CLINIC | Age: 33
End: 2023-07-11
Payer: MEDICAID

## 2023-07-11 ENCOUNTER — ASOB RESULT (OUTPATIENT)
Age: 33
End: 2023-07-11

## 2023-07-11 PROCEDURE — 99442: CPT

## 2023-07-17 ENCOUNTER — APPOINTMENT (OUTPATIENT)
Dept: ANTEPARTUM | Facility: CLINIC | Age: 33
End: 2023-07-17
Payer: MEDICAID

## 2023-07-17 PROCEDURE — 36415 COLL VENOUS BLD VENIPUNCTURE: CPT

## 2023-07-20 LAB
ADDITIONAL US: NORMAL
AFP MOM: 0.77
AFP VALUE: 20.5 NG/ML
COLLECTED ON 2: NORMAL
COLLECTED ON: NORMAL
CRL SCAN TWIN B: NORMAL
CRL SCAN: NORMAL
CROWN RUMP LENGTH TWIN B: NORMAL
CROWN RUMP LENGTH: 63.2 MM
DIA MOM: 0.91
DIA VALUE: 104.7 PG/ML
DOWN SYNDROME AGE RISK: NORMAL
DOWN SYNDROME INTERPRETATION: NORMAL
DOWN SYNDROME SCREENING RISK: NORMAL
FIRST TRIMESTER SAMPLE: NORMAL
GEST. AGE ON COLLECTION DATE: 12.6 WEEKS
GESTATIONAL AGE: 17.6 WEEKS
HCG MOM: 0.99
HCG VALUE: 19.3 IU/ML
INSULIN DEP DIABETES: NO
MATERNAL AGE AT EDD: 35.7 YR
NT MOM TWIN B: NORMAL
NT TWIN B: NORMAL
NUCHAL TRANSLUCENCY (NT): 1.7 MM
NUCHAL TRANSLUCENCY MOM: 1.04
NUMBER OF FETUSES: 1
OPEN SPINA BIFIDA: NORMAL
OSB INTERPRETATION: NORMAL
PAPP-A MOM: 0.38
PAPP-A VALUE: 224.9 NG/ML
RACE: NORMAL
SECOND TRIMESTER SAMPLE: NORMAL
SEQUENTIAL 2 COMMENTS: NORMAL
SEQUENTIAL 2 NOTE: NORMAL
SEQUENTIAL 2 RESULTS: NORMAL
SEQUENTIAL 2 TEST RESULTS: NORMAL
SONOGRAPHER ID#: NORMAL
TRISOMY 18 AGE RISK: NORMAL
TRISOMY 18 INTERPRETATION: NORMAL
TRISOMY 18 SCREENING RISK: NORMAL
UE3 MOM: 0.72
UE3 VALUE: 0.8 NG/ML
WEIGHT AFP: 239 LBS
WEIGHT: 243 LBS

## 2023-08-01 DIAGNOSIS — O99.210 OBESITY COMPLICATING PREGNANCY, UNSPECIFIED TRIMESTER: ICD-10-CM

## 2023-08-07 ENCOUNTER — APPOINTMENT (OUTPATIENT)
Dept: ANTEPARTUM | Facility: CLINIC | Age: 33
End: 2023-08-07
Payer: MEDICAID

## 2023-08-07 ENCOUNTER — ASOB RESULT (OUTPATIENT)
Age: 33
End: 2023-08-07

## 2023-08-07 ENCOUNTER — APPOINTMENT (OUTPATIENT)
Dept: MATERNAL FETAL MEDICINE | Facility: CLINIC | Age: 33
End: 2023-08-07
Payer: MEDICAID

## 2023-08-07 VITALS
BODY MASS INDEX: 40.65 KG/M2 | DIASTOLIC BLOOD PRESSURE: 76 MMHG | RESPIRATION RATE: 18 BRPM | WEIGHT: 241 LBS | HEART RATE: 94 BPM | OXYGEN SATURATION: 99 % | SYSTOLIC BLOOD PRESSURE: 110 MMHG | HEIGHT: 64.5 IN

## 2023-08-07 DIAGNOSIS — Z71.85 ENCOUNTER FOR IMMUNIZATION SAFETY COUNSELING: ICD-10-CM

## 2023-08-07 DIAGNOSIS — O09.522 SUPERVISION OF ELDERLY MULTIGRAVIDA, SECOND TRIMESTER: ICD-10-CM

## 2023-08-07 DIAGNOSIS — O28.0 ABNORMAL HEMATOLOGICAL FINDING ON ANTENATAL SCREENING OF MOTHER: ICD-10-CM

## 2023-08-07 DIAGNOSIS — O99.212 OBESITY COMPLICATING PREGNANCY, SECOND TRIMESTER: ICD-10-CM

## 2023-08-07 DIAGNOSIS — O09.899 ABNORMAL HEMATOLOGICAL FINDING ON ANTENATAL SCREENING OF MOTHER: ICD-10-CM

## 2023-08-07 DIAGNOSIS — Z3A.20 20 WEEKS GESTATION OF PREGNANCY: ICD-10-CM

## 2023-08-07 PROCEDURE — 76811 OB US DETAILED SNGL FETUS: CPT

## 2023-08-07 PROCEDURE — 76817 TRANSVAGINAL US OBSTETRIC: CPT

## 2023-08-07 PROCEDURE — 99215 OFFICE O/P EST HI 40 MIN: CPT | Mod: TH

## 2023-08-07 RX ORDER — ASPIRIN 81 MG
81 TABLET, DELAYED RELEASE (ENTERIC COATED) ORAL
Refills: 0 | Status: ACTIVE | COMMUNITY

## 2023-08-07 RX ORDER — PRENATAL VIT NO.126/IRON/FOLIC 28MG-0.8MG
28-0.8 TABLET ORAL
Refills: 0 | Status: ACTIVE | COMMUNITY

## 2023-08-07 NOTE — OB HISTORY
[Definite:  ___ (Date)] : the last menstrual period was [unfilled] [Normal Amount/Duration] : was of a normal amount and duration [Regular Cycle Intervals] : periods have been regular [Pregnancy History] : girl [LMP: ___] : LMP: [unfilled] [GREG: ___] : GREG: [unfilled] [EGA: ___ wks] : EGA: [unfilled] wks [Spontaneous] : Spontaneous conception [Spotting Between  Menses] : no spotting between menses [___] : no pregnancy complications reported [FreeTextEntry1] : First prenatal visit was on May 23, 2023.   .  Pt declined prenatal dx.  NIPS LR on  7/17/2023.  Pt has low PAPPA on first trimester screen and has been taking ASA 1-2 tabs alternating.  Pt is also taking PNV.  Pts first prenatal visit was on 5/23/2023.  Pt states good FM, no ctx pain or vaginal bleeding/leaking.

## 2023-08-07 NOTE — ACTIVE PROBLEMS
[Diabetes Mellitus] : no diabetes mellitus [Hypertension] : no hypertension [Heart Disease] : no heart disease [Autoimmune Disease] : no autoimmune disease [Renal Disease] : no kidney disease, no UTI [Neurologic Disorder] : no neurologic disorder, no epilepsy [Depression] : no depression, no post partum depression [Psychiatric Disorders] : no psychiatric disorders [Hepatic Disorder] : no hepatitis, no liver disease [Thrombophlebitis] : no varicosities, no phlebitis [Thyroid Disorder] : no thyroid dysfunction [Trauma] : no trauma/violence [Blood Transfusion (___ Ml)] : no history of blood transfusion

## 2023-08-07 NOTE — VITALS
[LMP (date): ___] : LMP was on [unfilled] [GA =___ Weeks] : which calculates to a GA of [unfilled] weeks [GA= ___ Days] : and [unfilled] day(s) [GREG by LMP (date): ___] : The calculated GREG by LMP is [unfilled] [By LMP] : this is the final GREG

## 2023-08-07 NOTE — CHIEF COMPLAINT
[G ___] : G [unfilled] [P ___] : P [unfilled] [de-identified] : having maternal low STEPHANIE - A levels and advanced maternal age

## 2023-08-07 NOTE — DISCUSSION/SUMMARY
[FreeTextEntry1] : She is 20 weeks and 1 day gestation by her last menstrual period dates.  She is overweight and obesity has been associated with a number of maternal complications such as gestational diabetes, pre-eclampsia, thrombophlebitis, labor abnormalities, post-term pregnancies,  delivery, and operative complications. Obesity has been associated with adverse fetal outcomes such as late stillbirth and  deliveries.  Obese women also have a two to three-fold increased incidence in congenital anomalies. I recommended having a Glucola challenge test to screen for gestational diabetes between 24 and 28 weeks of gestation.   Regarding her advanced maternal age, she had genetic counseling on 2023.  She declined to have prenatal diagnostic testing..  She had a noninvasive prenatal screen test done 2023 that reported a low risk for fetal chromosomal malformations.  The fetal sex was reported to be female.  She had a first trimester and a second trimester screening test that also reported low risk for fetal chromosomal malformations.   She had a detailed fetal anatomy ultrasound examination today that was limited, however, the fetal anatomy seen was reported to be within normal limits.  She has been scheduled to have a follow-up ultrasound in 2 weeks to complete the fetal anatomy examination.   I told her that advanced maternal age has been associated with a higher incidence of gestational diabetes, and preeclampsia /eclampsia. She told me that she has been taking daily baby aspirin to decrease her risk of developing preeclampsia.  I recommended discontinuing the daily baby aspirin at 36 weeks of gestation.  I told her that advanced maternal age has been associated with an increased risk of stillbirth, and therefore, I recommend delivery during the 39 weeks of gestation in the event she has not given birth by 39 weeks of gestation.   The first trimester screening test reported maternal low STEPHANIE A levels.  I discussed the significance of a low STEPHANIE-A level. I told her that STEPHANIE-A levels equal or less than the 5th percentile have been associated with a 2.7-fold increased risk for having a low-birth-weight infant. I also told her that there is a 2.4-fold increased risk for having a delivery before 34 weeks of gestation, a 3.7-fold increased risk for having preeclampsia during pregnancy, a 3.3-fold increased risk for having a miscarriage or fetal loss before 24 weeks of gestation and a 1.9-fold increased risk for having a stillbirth or fetal loss at or after 24 weeks of gestation. The transvaginal ultrasound examination of the cervix done today reported a normal cervical length which suggests a low risk of  labor/delivery.  I advised her to continue her daily prenatal vitamins. I advised her to continue taking baby aspirin daily to decrease the risk of developing preeclampsia,  delivery, and a small baby. I also recommended having weekly testing of fetal well-being with BPPs/NSTs due to the increased risk for stillbirth starting at 36 weeks of gestation. I also recommend delivery at 39 weeks of gestation if she has not given birth before 39 weeks to reduce the risk of late stillbirths. I recommend having serial ultrasounds during the third trimester to monitor fetal growth.  I also recommended not having sexual intercourse or to use a condom during intercourse to reduce the chance of having a  delivery.  She was also advised to avoid stress during the course of the pregnancy. I gave her  labor precautions.  I recommended having the COVID 19 vaccine during pregnancy if not already vaccinated. She told me that she has not had the COVID-19 vaccines.  I discussed the safety and benefits of having the COVID-vaccine during pregnancy.  She told me that she does not want to have the COVID-19 vaccine during pregnancy.  I recommended having the Tdap vaccine between 27 and 36 weeks of gestation to prevent pertussis infection in the  infant. I recommended having the flu vaccine during flu season (October through May).

## 2023-08-07 NOTE — FAMILY HISTORY
[Reported Family History Of Birth Defects] : no congenital heart defects [Joe-Sachs Carrier] : no Joe-Sachs [Family History] : no mental retardation/autism [Reported Family History Of Genetic Disease] : no history of child defect in child of baby father

## 2023-08-21 ENCOUNTER — ASOB RESULT (OUTPATIENT)
Age: 33
End: 2023-08-21

## 2023-08-21 ENCOUNTER — APPOINTMENT (OUTPATIENT)
Dept: ANTEPARTUM | Facility: CLINIC | Age: 33
End: 2023-08-21
Payer: MEDICAID

## 2023-08-21 PROCEDURE — 76816 OB US FOLLOW-UP PER FETUS: CPT

## 2023-10-10 ENCOUNTER — APPOINTMENT (OUTPATIENT)
Dept: ANTEPARTUM | Facility: CLINIC | Age: 33
End: 2023-10-10
Payer: MEDICAID

## 2023-10-10 ENCOUNTER — ASOB RESULT (OUTPATIENT)
Age: 33
End: 2023-10-10

## 2023-10-10 PROCEDURE — 76816 OB US FOLLOW-UP PER FETUS: CPT

## 2023-10-20 NOTE — OB RN DELIVERY SUMMARY - BABY A: APGAR 1 MIN REFLEX IRRITABILITY, DELIVERY
PHYSICAL THERAPY EVALUATION  Type of Visit: Evaluation    See electronic medical record for Abuse and Falls Screening details.    Subjective       Presenting condition or subjective complaint: To relieve pain in neck, shoulders, arms & hands  Date of onset: 07/20/23    Relevant medical history: Hearing problems; History of fractures; Menopause; Migraines or headaches; Numbness or tingling in perianal area; Osteoporosis; Pain at night or rest; Vision problems   Dates & types of surgery: Tonsils removed, Tubaligation,Hysterectomy,back degenerative Disc s, back Degenerative Disc 1991, Left ankle on both sides broken, medal plate & screws on one side and screws only on the other side , Gallbladder removed , Cataract surgery    Prior diagnostic imaging/testing results: MRI; X-ray; Bone scan     Prior therapy history for the same diagnosis, illness or injury: No      Patient having pain in right arm radiating down into wrist and hand. Also left arm but not as severe as right arm. Symptoms started 6 months. Has been doing exercises on a regular basis, walking hallways (1 mile), stairways. Symptoms are constant, and worst when trying to sleep or upon waking. Pain is worse with dressing, reaching right arm, showering.       Prior Level of Function  No arm pain or paresthesias prior to 6 months ago    Living Environment  Social support: Alone   Type of home: Apartment/condo; Multi-level   Stairs to enter the home: Yes 24 Is there a railing: Yes   Ramp: No   Stairs inside the home: No       Help at home: None  Equipment owned: Straight Cane     Employment: No    Hobbies/Interests: Little bit of everything    Patient goals for therapy: Learn exercises to make me strong and do not hurt the areas that are causing my pain       Objective   CERVICAL SPINE EVALUATION  PAIN: Pain Location: right arm and  hand  Pain Frequency: constant  Pain is Exacerbated By: laying, early mobility in the morning  Pain is Relieved By: conitnued  mobility  Pain Progression: Worsened  INTEGUMENTARY (edema, incisions):   POSTURE: Standing Posture: Rounded shoulders, Forward head  Sitting Posture: Rounded shoulders, Forward head  GAIT:   Weightbearing Status:   Assistive Device(s):   Gait Deviations:   BALANCE/PROPRIOCEPTION:   WEIGHTBEARING ALIGNMENT:   ROM:   (Degrees) Left AROM Right AROM    Cervical Flexion WNL    Cervical Extension WNL    Cervical Side bend      Cervical Rotation 45 45    Cervical Protrusion     Cervical Retraction     Thoracic Flexion     Thoracic Extension     Thoracic Rotation       Left AROM Left PROM Right AROM Right PROM   Shoulder Flexion       Shoulder Extension       Shoulder Abduction WFL  WFL (painful arc)    Shoulder Adduction       Shoulder IR WFL  WFL    Shoulder ER WFL  WFL    Shoulder Horiz Abduction       Shoulder Horiz Adduction       Pain:   End Feel:     MYOTOMES:    Left Right   C1-2 (Neck Flexion)     C3 (Neck Side Bend)      C4 (Shrug)     C5 (Deltoid)     C6 (Biceps) 5 4+   C7 (Triceps) 5 4+   C8 (Thumb Ext) 5 5   T1 (Intrinsics) 4+ 4+     DTR S:    Left Right   C5 (Biceps) 3 3   C6 (Brachioradialis)     C7 (Triceps)     L4 (Quad)     S1 (Achilles)       CORD SIGNS: Lynn negative L, Lynn negative R  DERMATOMES:   NEURAL TENSION:    Left Right   SLR     SLR with DF     Femoral Nerve     Slump     Martin (Lumbar)     Martin (Thoracic)     Martin (Cervical)     Median  Positive   Ulnar     Radial        FLEXIBILITY:    SPECIAL TESTS:    Left Right   Alar Ligament    Cervical Flexion-Rotation     Cervical Rot/Lateral Flex Negative  Negative    Compression     Distraction  Positive   Spurling s  Negative    Thoracic Outlet Screen (Roslyn Adson)     Transverse Ligament     Vertebral Artery     Cotton Roll Test     Craniocervical Flexor Endurance Test     Mannheimer Test            PALPATION:   SPINAL SEGMENTAL CONCLUSIONS:       Assessment & Plan   CLINICAL IMPRESSIONS  Medical Diagnosis: M25.511, G89.29, M25.512  (ICD-10-CM) - Chronic pain of both shoulders    Treatment Diagnosis: neck and arm pain with mobility impairments   Impression/Assessment: Patient is a 75 year old female with chief complaints of neck pain with bilateral arm pain and paresthesias (right more painful than left).  The following significant findings have been identified: Pain, Decreased ROM/flexibility, Decreased joint mobility, Decreased strength, Impaired muscle performance, Decreased activity tolerance, and Impaired posture. These impairments interfere with their ability to perform self care tasks, recreational activities, and household chores as compared to previous level of function. Patient will benefit from skilled physical therapy to address impairments and functional limitations in order to achieve their goals.       Clinical Decision Making (Complexity):  Clinical Presentation: Stable/Uncomplicated  Clinical Presentation Rationale: based on medical and personal factors listed in PT evaluation  Clinical Decision Making (Complexity): Low complexity    PLAN OF CARE  Treatment Interventions:  Interventions: Manual Therapy, Neuromuscular Re-education, Therapeutic Activity, Therapeutic Exercise, Self-Care/Home Management    Long Term Goals     PT Goal 1  Goal Identifier: HEP  Goal Description: Patient will demonstrate >50% compliance with home exercise program to promote independence and progress towards  Target Date: 01/12/24  PT Goal 2  Goal Identifier: overhead reaching  Goal Description: Patient will perform overhead reaching above 135 degrees without pain to improve tolerance with household chores  Target Date: 01/12/24      Frequency of Treatment: weekly  Duration of Treatment: 12 weeks    Recommended Referrals to Other Professionals:   Education Assessment:   Learner/Method: Patient    Risks and benefits of evaluation/treatment have been explained.   Patient/Family/caregiver agrees with Plan of Care.     Evaluation Time:             Signing  Clinician: Fernando Malone PT      [facility]                                                                                   OUTPATIENT PHYSICAL THERAPY      PLAN OF TREATMENT FOR OUTPATIENT REHABILITATION   Patient's Last Name, First Name, Jesika Lyle YOB: 1947   Provider's Name   [facility]   Medical Record No.  [id number]     Onset Date: 07/20/23  Start of Care Date: 10/20/23     Medical Diagnosis:  M25.511, G89.29, M25.512 (ICD-10-CM) - Chronic pain of both shoulders      PT Treatment Diagnosis:  neck and arm pain with mobility impairments Plan of Treatment  Frequency/Duration: weekly/ 12 weeks    Certification date from 10/20/23 to 01/12/24         See note for plan of treatment details and functional goals     Fernando Malone PT                         I CERTIFY THE NEED FOR THESE SERVICES FURNISHED UNDER        THIS PLAN OF TREATMENT AND WHILE UNDER MY CARE     (Physician attestation of this document indicates review and certification of the therapy plan).                Referring Provider:  Abrahan Diez      Initial Assessment  See Epic Evaluation- Start of Care Date: 10/20/23                 (2) cough or sneeze

## 2023-11-28 ENCOUNTER — ASOB RESULT (OUTPATIENT)
Age: 33
End: 2023-11-28

## 2023-11-28 ENCOUNTER — APPOINTMENT (OUTPATIENT)
Dept: ANTEPARTUM | Facility: CLINIC | Age: 33
End: 2023-11-28
Payer: MEDICAID

## 2023-11-28 PROCEDURE — 76819 FETAL BIOPHYS PROFIL W/O NST: CPT

## 2023-11-28 PROCEDURE — 76816 OB US FOLLOW-UP PER FETUS: CPT

## 2023-12-05 ENCOUNTER — APPOINTMENT (OUTPATIENT)
Dept: ANTEPARTUM | Facility: CLINIC | Age: 33
End: 2023-12-05
Payer: MEDICAID

## 2023-12-05 ENCOUNTER — ASOB RESULT (OUTPATIENT)
Age: 33
End: 2023-12-05

## 2023-12-05 PROCEDURE — 76818 FETAL BIOPHYS PROFILE W/NST: CPT

## 2023-12-05 PROCEDURE — 76820 UMBILICAL ARTERY ECHO: CPT

## 2023-12-12 ENCOUNTER — APPOINTMENT (OUTPATIENT)
Dept: ANTEPARTUM | Facility: CLINIC | Age: 33
End: 2023-12-12
Payer: MEDICAID

## 2023-12-12 ENCOUNTER — ASOB RESULT (OUTPATIENT)
Age: 33
End: 2023-12-12

## 2023-12-12 PROCEDURE — 76818 FETAL BIOPHYS PROFILE W/NST: CPT

## 2023-12-17 ENCOUNTER — INPATIENT (INPATIENT)
Facility: HOSPITAL | Age: 33
LOS: 2 days | Discharge: HOME | End: 2023-12-20
Attending: OBSTETRICS & GYNECOLOGY | Admitting: OBSTETRICS & GYNECOLOGY
Payer: COMMERCIAL

## 2023-12-17 VITALS
HEIGHT: 64 IN | WEIGHT: 246.92 LBS | RESPIRATION RATE: 20 BRPM | TEMPERATURE: 98 F | HEART RATE: 88 BPM | SYSTOLIC BLOOD PRESSURE: 115 MMHG | DIASTOLIC BLOOD PRESSURE: 77 MMHG

## 2023-12-17 DIAGNOSIS — Z3A.39 39 WEEKS GESTATION OF PREGNANCY: ICD-10-CM

## 2023-12-17 DIAGNOSIS — O09.523 SUPERVISION OF ELDERLY MULTIGRAVIDA, THIRD TRIMESTER: ICD-10-CM

## 2023-12-17 LAB
BASOPHILS # BLD AUTO: 0.03 K/UL — SIGNIFICANT CHANGE UP (ref 0–0.2)
BASOPHILS # BLD AUTO: 0.03 K/UL — SIGNIFICANT CHANGE UP (ref 0–0.2)
BASOPHILS NFR BLD AUTO: 0.3 % — SIGNIFICANT CHANGE UP (ref 0–2)
BASOPHILS NFR BLD AUTO: 0.3 % — SIGNIFICANT CHANGE UP (ref 0–2)
BLD GP AB SCN SERPL QL: SIGNIFICANT CHANGE UP
BLD GP AB SCN SERPL QL: SIGNIFICANT CHANGE UP
EOSINOPHIL # BLD AUTO: 0.04 K/UL — SIGNIFICANT CHANGE UP (ref 0–0.5)
EOSINOPHIL # BLD AUTO: 0.04 K/UL — SIGNIFICANT CHANGE UP (ref 0–0.5)
EOSINOPHIL NFR BLD AUTO: 0.5 % — SIGNIFICANT CHANGE UP (ref 0–6)
EOSINOPHIL NFR BLD AUTO: 0.5 % — SIGNIFICANT CHANGE UP (ref 0–6)
GLUCOSE BLDC GLUCOMTR-MCNC: 73 MG/DL — SIGNIFICANT CHANGE UP (ref 70–99)
GLUCOSE BLDC GLUCOMTR-MCNC: 73 MG/DL — SIGNIFICANT CHANGE UP (ref 70–99)
HCT VFR BLD CALC: 37.8 % — SIGNIFICANT CHANGE UP (ref 34.5–45)
HCT VFR BLD CALC: 37.8 % — SIGNIFICANT CHANGE UP (ref 34.5–45)
HGB BLD-MCNC: 12.8 G/DL — SIGNIFICANT CHANGE UP (ref 11.5–15.5)
HGB BLD-MCNC: 12.8 G/DL — SIGNIFICANT CHANGE UP (ref 11.5–15.5)
IMM GRANULOCYTES NFR BLD AUTO: 0.5 % — SIGNIFICANT CHANGE UP (ref 0–0.9)
IMM GRANULOCYTES NFR BLD AUTO: 0.5 % — SIGNIFICANT CHANGE UP (ref 0–0.9)
LYMPHOCYTES # BLD AUTO: 1.68 K/UL — SIGNIFICANT CHANGE UP (ref 1–3.3)
LYMPHOCYTES # BLD AUTO: 1.68 K/UL — SIGNIFICANT CHANGE UP (ref 1–3.3)
LYMPHOCYTES # BLD AUTO: 19.5 % — SIGNIFICANT CHANGE UP (ref 13–44)
LYMPHOCYTES # BLD AUTO: 19.5 % — SIGNIFICANT CHANGE UP (ref 13–44)
MCHC RBC-ENTMCNC: 27.3 PG — SIGNIFICANT CHANGE UP (ref 27–34)
MCHC RBC-ENTMCNC: 27.3 PG — SIGNIFICANT CHANGE UP (ref 27–34)
MCHC RBC-ENTMCNC: 33.9 GM/DL — SIGNIFICANT CHANGE UP (ref 32–36)
MCHC RBC-ENTMCNC: 33.9 GM/DL — SIGNIFICANT CHANGE UP (ref 32–36)
MCV RBC AUTO: 80.6 FL — SIGNIFICANT CHANGE UP (ref 80–100)
MCV RBC AUTO: 80.6 FL — SIGNIFICANT CHANGE UP (ref 80–100)
MONOCYTES # BLD AUTO: 0.39 K/UL — SIGNIFICANT CHANGE UP (ref 0–0.9)
MONOCYTES # BLD AUTO: 0.39 K/UL — SIGNIFICANT CHANGE UP (ref 0–0.9)
MONOCYTES NFR BLD AUTO: 4.5 % — SIGNIFICANT CHANGE UP (ref 2–14)
MONOCYTES NFR BLD AUTO: 4.5 % — SIGNIFICANT CHANGE UP (ref 2–14)
NEUTROPHILS # BLD AUTO: 6.42 K/UL — SIGNIFICANT CHANGE UP (ref 1.8–7.4)
NEUTROPHILS # BLD AUTO: 6.42 K/UL — SIGNIFICANT CHANGE UP (ref 1.8–7.4)
NEUTROPHILS NFR BLD AUTO: 74.7 % — SIGNIFICANT CHANGE UP (ref 43–77)
NEUTROPHILS NFR BLD AUTO: 74.7 % — SIGNIFICANT CHANGE UP (ref 43–77)
PLATELET # BLD AUTO: 221 K/UL — SIGNIFICANT CHANGE UP (ref 150–400)
PLATELET # BLD AUTO: 221 K/UL — SIGNIFICANT CHANGE UP (ref 150–400)
RBC # BLD: 4.69 M/UL — SIGNIFICANT CHANGE UP (ref 3.8–5.2)
RBC # BLD: 4.69 M/UL — SIGNIFICANT CHANGE UP (ref 3.8–5.2)
RBC # FLD: 14.2 % — SIGNIFICANT CHANGE UP (ref 10.3–14.5)
RBC # FLD: 14.2 % — SIGNIFICANT CHANGE UP (ref 10.3–14.5)
WBC # BLD: 8.6 K/UL — SIGNIFICANT CHANGE UP (ref 3.8–10.5)
WBC # BLD: 8.6 K/UL — SIGNIFICANT CHANGE UP (ref 3.8–10.5)
WBC # FLD AUTO: 8.6 K/UL — SIGNIFICANT CHANGE UP (ref 3.8–10.5)
WBC # FLD AUTO: 8.6 K/UL — SIGNIFICANT CHANGE UP (ref 3.8–10.5)

## 2023-12-17 RX ORDER — SODIUM CHLORIDE 9 MG/ML
1000 INJECTION, SOLUTION INTRAVENOUS
Refills: 0 | Status: DISCONTINUED | OUTPATIENT
Start: 2023-12-17 | End: 2023-12-18

## 2023-12-17 RX ORDER — CHLORHEXIDINE GLUCONATE 213 G/1000ML
1 SOLUTION TOPICAL DAILY
Refills: 0 | Status: DISCONTINUED | OUTPATIENT
Start: 2023-12-17 | End: 2023-12-18

## 2023-12-17 RX ORDER — SODIUM CHLORIDE 9 MG/ML
1000 INJECTION INTRAMUSCULAR; INTRAVENOUS; SUBCUTANEOUS
Refills: 0 | Status: DISCONTINUED | OUTPATIENT
Start: 2023-12-17 | End: 2023-12-18

## 2023-12-17 RX ORDER — CITRIC ACID/SODIUM CITRATE 300-500 MG
30 SOLUTION, ORAL ORAL ONCE
Refills: 0 | Status: COMPLETED | OUTPATIENT
Start: 2023-12-17 | End: 2023-12-17

## 2023-12-17 RX ORDER — AMPICILLIN TRIHYDRATE 250 MG
2 CAPSULE ORAL ONCE
Refills: 0 | Status: COMPLETED | OUTPATIENT
Start: 2023-12-17 | End: 2023-12-17

## 2023-12-17 RX ORDER — AMPICILLIN TRIHYDRATE 250 MG
1 CAPSULE ORAL EVERY 4 HOURS
Refills: 0 | Status: DISCONTINUED | OUTPATIENT
Start: 2023-12-17 | End: 2023-12-18

## 2023-12-17 RX ORDER — OXYTOCIN 10 UNIT/ML
333.33 VIAL (ML) INJECTION
Qty: 20 | Refills: 0 | Status: COMPLETED | OUTPATIENT
Start: 2023-12-17 | End: 2023-12-17

## 2023-12-17 RX ORDER — OXYTOCIN 10 UNIT/ML
VIAL (ML) INJECTION
Qty: 30 | Refills: 0 | Status: DISCONTINUED | OUTPATIENT
Start: 2023-12-17 | End: 2023-12-18

## 2023-12-17 RX ORDER — FAMOTIDINE 10 MG/ML
20 INJECTION INTRAVENOUS DAILY
Refills: 0 | Status: DISCONTINUED | OUTPATIENT
Start: 2023-12-17 | End: 2023-12-20

## 2023-12-17 RX ORDER — SODIUM CHLORIDE 9 MG/ML
1000 INJECTION, SOLUTION INTRAVENOUS
Refills: 0 | Status: DISCONTINUED | OUTPATIENT
Start: 2023-12-17 | End: 2023-12-17

## 2023-12-17 RX ADMIN — SODIUM CHLORIDE 125 MILLILITER(S): 9 INJECTION, SOLUTION INTRAVENOUS at 15:10

## 2023-12-17 RX ADMIN — FAMOTIDINE 20 MILLIGRAM(S): 10 INJECTION INTRAVENOUS at 18:26

## 2023-12-17 RX ADMIN — Medication 30 MILLILITER(S): at 21:12

## 2023-12-17 RX ADMIN — Medication 108 GRAM(S): at 19:46

## 2023-12-17 RX ADMIN — Medication 200 GRAM(S): at 15:52

## 2023-12-17 RX ADMIN — Medication 108 GRAM(S): at 23:55

## 2023-12-17 RX ADMIN — Medication 2 MILLIUNIT(S)/MIN: at 16:20

## 2023-12-17 NOTE — OB RN PATIENT PROFILE - PROVIDER NOTIFICATION
Assistance OOB with selected safe patient handling equipment if applicable/Assistance with ambulation/Communicate fall risk and risk factors to all staff, patient, and family/Monitor gait and stability/Provide visual cue: yellow wristband, yellow gown, etc/Reinforce activity limits and safety measures with patient and family/Call bell, personal items and telephone in reach/Instruct patient to call for assistance before getting out of bed/chair/stretcher/Non-slip footwear applied when patient is off stretcher/[place] to call system/Physically safe environment - no spills, clutter or unnecessary equipment/Purposeful Proactive Rounding/Room/bathroom lighting operational, light cord in reach Declines

## 2023-12-17 NOTE — OB RN DELIVERY SUMMARY - NS_SEPSISRSKCALC_OBGYN_ALL_OB_FT
EOS calculated successfully. EOS Risk Factor: 0.5/1000 live births ([facility] national incidence); GA=39w1d; Temp=98.6; ROM=5.583; GBS='Positive'; Antibiotics='GBS specific antibiotics > 2 hrs prior to birth'   EOS calculated successfully. EOS Risk Factor: 0.5/1000 live births ([facility] national incidence); GA=39w1d; Temp=98.6; ROM=5.583; GBS='Positive'; Antibiotics='GBS specific antibiotics > 2 hrs prior to birth'

## 2023-12-17 NOTE — OB PROVIDER H&P - NSLOWPPHRISK_OBGYN_A_OB
No previous uterine incision/Rand Pregnancy/Less than or equal to 4 previous vaginal births/No known bleeding disorder/No history of postpartum hemorrhage/No other PPH risks indicated

## 2023-12-17 NOTE — OB RN PATIENT PROFILE - POST PARTUM DEPRESSION SCREEN OB 2
Bedside shift change report given to Josseiln Schultz RN by TAMMY Rowland. Report included the following information SBAR, Kardex, Intake/Output, MAR, Accordion and Cardiac Rhythm SB/NSR. no

## 2023-12-17 NOTE — OB PROVIDER H&P - ATTENDING COMMENTS
Agree with Dr Redd' assessment and plan.  Pt is a 33 year old  at 39w by LMP who presents to L&D for IOL due to Low Raymundo-A, obesity BMI 40.9.  Pt feeling well today. Endorses good fetal movements. Denies contractions, leakage of fluid, vaginal bleeding. No other complaints.   Her prenatal course has otherwise been uncomplicated to date  Pt also consented for postpartum bilateral salpingectomies for permanent sterilization.    Plan:  Admit to L+D  Ampicillin for GBS prophylaxis  analgesia prn  Expectant mgmt  Labor and Delivery consent as well as postpartum tubal consent done using Kazakh language interperter and all questions were answered and patient verbalized understanding.  She will also accept blood products if needed.    MAME Carballo Agree with Dr Redd' assessment and plan.  Pt is a 33 year old  at 39w by LMP who presents to L&D for IOL due to Low Raymundo-A, obesity BMI 40.9.  Pt feeling well today. Endorses good fetal movements. Denies contractions, leakage of fluid, vaginal bleeding. No other complaints.   Her prenatal course has otherwise been uncomplicated to date  Pt also consented for postpartum bilateral salpingectomies for permanent sterilization.    Plan:  Admit to L+D  Ampicillin for GBS prophylaxis  analgesia prn  Expectant mgmt  Labor and Delivery consent as well as postpartum tubal consent done using Romansh language interperter and all questions were answered and patient verbalized understanding.  She will also accept blood products if needed.    MAME Carballo

## 2023-12-17 NOTE — OB RN DELIVERY SUMMARY - NS_LABORCHARACTER_OBGYN_ALL_OB
Induction of labor-Medicinal/External electronic FM Induction of labor-AROM/Induction of labor-Medicinal/External electronic FM/Antibiotics in labor

## 2023-12-17 NOTE — OB PROVIDER H&P - NSHPPHYSICALEXAM_GEN_ALL_CORE
Gen: NAD, well-appearing   Abd: Soft, gravid  Ext: non-tender, non-edematous  SVE:    Bedside sono:  FHT: baseline 140, moderate variability, + accels, - decels  [place]:uterine irritability Gen: NAD, well-appearing   Abd: Soft, gravid  Ext: non-tender, non-edematous  SVE:    Bedside sono:  FHT: baseline 140, moderate variability, + accels, - decels  [place]:uterine irritability T(F): 98.42 (17 Dec 2023 15:17), Max: 98.42 (17 Dec 2023 15:17)  HR: 88 (17 Dec 2023 15:16) (88 - 88)  BP: 115/77 (17 Dec 2023 15:16) (115/77 - 115/77)  RR: 20 (17 Dec 2023 15:17) (20 - 20)        Gen: NAD, well-appearing   Abd: Soft, gravid  Ext: non-tender, non-edematous  SVE:  2.5/40/-2  Bedside sono: vtx, anterior  FHT: baseline 140, moderate variability, + accels, - decels  [place]: uterine irritability T(F): 98.42 (17 Dec 2023 15:17), Max: 98.42 (17 Dec 2023 15:17)  HR: 88 (17 Dec 2023 15:16) (88 - 88)  BP: 115/77 (17 Dec 2023 15:16) (115/77 - 115/77)  RR: 20 (17 Dec 2023 15:17) (20 - 20)        Gen: NAD, well-appearing   Abd: Soft, gravid  Ext: non-tender, non-edematous  SVE:  2.5/40/-2  Bedside sono: vtx, anterior  FHT: baseline 140, moderate variability, + accels, - decels  [place]: uterine irritability

## 2023-12-17 NOTE — OB RN PATIENT PROFILE - ANESTHESIA, PREVIOUS REACTION, PROFILE
Patient with PMH diverticulitis presents with LLQ pain worse with having to defecate. No fevers, but nausea. LLQ TTP on exam, otherwise appears very well and is tolerating PO. WIll check labs, CT A/P and give toradol/zofran and IV hydration and reassess.
none

## 2023-12-17 NOTE — OB RN DELIVERY SUMMARY - NS_NEWBORNAALIVE_OBGYN_ALL_OB
Problem: PAIN - ADULT  Goal: Verbalizes/displays adequate comfort level or baseline comfort level  Description: Interventions:  - Encourage patient to monitor pain and request assistance  - Assess pain using appropriate pain scale  - Administer analgesics based on type and severity of pain and evaluate response  - Implement non-pharmacological measures as appropriate and evaluate response  - Consider cultural and social influences on pain and pain management  - Notify physician/advanced practitioner if interventions unsuccessful or patient reports new pain  Outcome: Progressing     Problem: GASTROINTESTINAL - ADULT  Goal: Minimal or absence of nausea and/or vomiting  Description: INTERVENTIONS:  - Administer IV fluids if ordered to ensure adequate hydration  - Maintain NPO status until nausea and vomiting are resolved  - Nasogastric tube if ordered  - Administer ordered antiemetic medications as needed  - Provide nonpharmacologic comfort measures as appropriate  - Advance diet as tolerated, if ordered  - Consider nutrition services referral to assist patient with adequate nutrition and appropriate food choices  Outcome: Progressing Yes

## 2023-12-17 NOTE — OB RN DELIVERY SUMMARY - NSSELHIDDEN_OBGYN_ALL_OB_FT
[NS_DeliveryAttending1_OBGYN_ALL_OB_FT:[email or web address]=],[NS_DeliveryAssist1_OBGYN_ALL_OB_FT:[id number]=] [NS_DeliveryAttending1_OBGYN_ALL_OB_FT:[email or web address]=],[NS_DeliveryAssist1_OBGYN_ALL_OB_FT:[id number]] [NS_DeliveryAttending1_OBGYN_ALL_OB_FT:[id number]=],[NS_DeliveryAssist1_OBGYN_ALL_OB_FT:[id number]=],[NS_DeliveryRN_OBGYN_ALL_OB_FT:[id number]] [NS_DeliveryAttending1_OBGYN_ALL_OB_FT:[email or web address]=],[NS_DeliveryAssist1_OBGYN_ALL_OB_FT:[id number]=],[NS_DeliveryRN_OBGYN_ALL_OB_FT:[id number]=]

## 2023-12-17 NOTE — OB PROVIDER H&P - ASSESSMENT
A/P: 33y  @ 39w GA admitted to L&D for IOL.   -Admit to L&D  -Consent  -Admission labs  -NPO, except ice chips   -IV fluids  -Labor: Intact. for IOL with pitocin  -Fetus: NST reactive. Continuous toco and fetal monitoring.   -GBS: Positive, ampicillin for GBS ppx   -Analgesia: prn   -DVT ppx: Ambulate and SCD's while in bed     Discussed with Dr. Bazan

## 2023-12-17 NOTE — OB PROVIDER H&P - HISTORY OF PRESENT ILLNESS
33y 33 year old  at 39w by LMP who presents to L&D for IOL due to Low Raymundo-A, obesity BMI 40.9.  Pt feeling well today. Endorses good fetal movements. Denies contractions, leakage of fluid, vaginal bleeding. No other complaints.   GREG: 23   LMP: 3/19/23   Patient receives prenatal care with [facility]    Pregnancy course:   AMA   Low Raymundo-A (0.38 MoM)   Obesity BMI 40.9     PPBC: BTL consent signed 10/31/23     Obhx: , NSVDx4 (, , ,   Gynhx: ASCUS, +HPV HR non16/18 on 2023, colpo postpartum   Pmhx: obesity, hx anemia   Pshx: denies   Meds: PNV, ASA   Allergies: nkda     Ultrasound: vtx, anterior ()   EFW: 2594g 22%ile ()      [age] year old  at 39w by LMP who presents to L&D for IOL due to Low Raymundo-A, obesity BMI 40.9.  Pt feeling well today. Endorses good fetal movements. Denies contractions, leakage of fluid, vaginal bleeding. No other complaints.   GREG: 23   LMP: 3/19/23   Patient receives prenatal care with [facility]    Pregnancy course:   AMA   Low Raymundo-A (0.38 MoM)   Obesity BMI 40.9     PPBC: BTL consent signed 10/31/23     Obhx: , NSVDx4 (, , ,   Gynhx: ASCUS, +HPV HR non16/18 on 2023, colpo postpartum   Pmhx: obesity, hx anemia   Pshx: denies   Meds: PNV, ASA   Allergies: nkda     Ultrasound: vtx, anterior ()   EFW: 2594g 22%ile ()      [age] year old  at 39w by LMP who presents to L&D for IOL due to Low Raymundo-A, obesity BMI 40.9.  Pt feeling well today. Endorses good fetal movements. Denies contractions, leakage of fluid, vaginal bleeding. No other complaints.   GREG: 23   LMP: 3/19/23   Patient receives prenatal care with [facility]    Pregnancy course:   AMA   Low Raymundo-A (0.38 MoM)   Obesity BMI 40.9     PPBC: BTL consent signed 10/31/23     Obhx: , NSVDx4 (, , ,   Gynhx: ASCUS, +HPV HR non16/18 on 2023, colpo postpartum   Pmhx: obesity, anemia, dyslipidemia   Pshx: denies   Meds: PNV, ASA   Allergies: nkda     Ultrasound: vtx, anterior ()   EFW: 2594g 22%ile ()      [age] year old  at 39w by LMP who presents to L&D for IOL due to Low Raymundo-A, obesity BMI 40.9.  Pt feeling well today. Endorses good fetal movements. Denies contractions, leakage of fluid, vaginal bleeding. No other complaints.   GREG: 23   LMP: 3/19/23   Patient receives prenatal care with [facility]    Pregnancy course:   AMA   Low Raymundo-A (0.38 MoM)   Obesity BMI 40.9     PPBC: BTL consent signed 10/31/23     Obhx: , NSVDx4 (, , ,   Gynhx: ASCUS, +HPV HR non16/18 on 2023, colpo postpartum   Pmhx: obesity, anemia, dyslipidemia   Pshx: denies   Meds: PNV, ASA   Allergies: nkda     Ultrasound: vtx, anterior ()   EFW: 2594g 22%ile ()      33 year old  at 39w by LMP who presents to L&D for IOL due to Low Raymundo-A, obesity BMI 40.9.  Pt feeling well today. Endorses good fetal movements. Denies contractions, leakage of fluid, vaginal bleeding. No other complaints.   GREG: 23   LMP: 3/19/23   [facility]    Pregnancy course:   AMA   Low Raymundo-A (0.38 MoM)   Obesity BMI 40.9     PPBC: BTL consent signed 10/31/23     Obhx: , NSVDx4 (, , ,   Gynhx: ASCUS, +HPV HR non16/18 on 2023, colpo postpartum   Pmhx: obesity, anemia, dyslipidemia   Pshx: denies   Meds: PNV, ASA   Allergies: nkda     Ultrasound: vtx, anterior ()   EFW: 2594g 22%ile ()      33 year old  at 39w by LMP who presents to L&D for IOL due to Low Raymundo-A, obesity BMI 40.9.  Pt feeling well today. Endorses good fetal movements. Denies contractions, leakage of fluid, vaginal bleeding. No other complaints.   GREG: 23   LMP: 3/19/23   [facility]    Pregnancy course:   AMA   Low Raymundo-A (0.38 MoM)   Obesity BMI 40.9     PPBC: BTL consent signed 10/31/23     Obhx: , NSVDx4 (, , ,   Gynhx: ASCUS, +HPV HR non16/18 on 2023, colpo postpartum   Pmhx: obesity, anemia, dyslipidemia   Pshx: denies   Meds: PNV, ASA   Allergies: nkda     Ultrasound: vtx, anterior ()   EFW: 2594g 22%ile ()

## 2023-12-17 NOTE — OB RN PATIENT PROFILE - INTERPRETER'S NAME
Subjective:     Damien Gutierrez is a 23 m.o. male here with parents. Patient brought in for Well Child ([facility]   home care     brought in by mom and dad damien jerry )       History was provided by the parents.    Damien Gutierrez is a 23 m.o. male who is brought in for this well child visit.    Current Issues:  Current concerns include none.    Review of Nutrition:  Current diet: family meals  Balanced diet? yes  Difficulties with feeding? no    Social Screening:  Current child-care arrangements: in home: primary caregiver is mother  Sibling relations: not asked  Parental coping and self-care: doing well; no concerns  Secondhand smoke exposure? no    Screening Questions:  Patient has a dental home: yes  Risk factors for hearing loss: no  Risk factors for anemia: no  Risk factors for tuberculosis: no    Review of Systems   Constitutional: Negative.  Negative for activity change, appetite change and fever.   HENT: Negative.  Negative for congestion and sore throat.    Eyes: Negative.  Negative for discharge and redness.   Respiratory: Negative.  Negative for cough and wheezing.    Cardiovascular: Negative.  Negative for chest pain and cyanosis.   Gastrointestinal: Negative.  Negative for constipation, diarrhea and vomiting.   Endocrine: Negative.    Genitourinary: Negative.  Negative for difficulty urinating and hematuria.   Musculoskeletal: Negative.    Skin: Negative.  Negative for rash and wound.   Allergic/Immunologic: Negative.    Neurological: Negative.  Negative for syncope and headaches.   Hematological: Negative.    Psychiatric/Behavioral: Negative.  Negative for behavioral problems and sleep disturbance.         Objective:     Physical Exam   Constitutional: He appears well-developed and well-nourished.   HENT:   Head: Normocephalic.   Right Ear: Tympanic membrane normal.   Left Ear: Tympanic membrane normal.   Nose: Nose normal.   Mouth/Throat: Mucous membranes are moist. Oropharynx is clear.   Eyes:  Conjunctivae and EOM are normal. Pupils are equal, round, and reactive to light.   Neck: No neck adenopathy.   Cardiovascular: Regular rhythm.  Pulses are palpable.    No murmur heard.  Pulmonary/Chest: Effort normal and breath sounds normal.   Abdominal: Soft. Bowel sounds are normal. He exhibits no distension.   Genitourinary: Penis normal.   Musculoskeletal: Normal range of motion.   Lymphadenopathy: No anterior cervical adenopathy or posterior cervical adenopathy.   Neurological: He is alert. He has normal strength and normal reflexes.       Assessment:      Healthy 23 m.o. male child.      Plan:      1. Anticipatory guidance discussed.  Gave handout on well-child issues at this age.    2. Low risk for autism    3. Immunizations today: per orders.     ADDITIONAL NOTE:   This is a patient well known to my practice who  has no past medical history on file.. The patient is here for well check presents with poor nutrition that bothers parents.     PE:  Per previous physical and additionally  Gen:NAD calm  CV:RRR and no murmur, 2+ pulses  GI: soft abdomen with normal BS, NT/ND  Neuro: good tone and brisk reflexes      Poor nutrition  -     CBC auto differential; Future  -     Comprehensive metabolic panel; Future    Need for vaccination  -     (In Office Administered) Hepatitis A Vaccine (Pediatric/Adolescent) (2 Dose) (IM)    Encounter for routine child health examination with abnormal findings       Tiffany

## 2023-12-17 NOTE — OB RN PATIENT PROFILE - FALL HARM RISK - UNIVERSAL INTERVENTIONS
Bed in lowest position, wheels locked, appropriate side rails in place/Call bell, personal items and telephone in reach/Instruct patient to call for assistance before getting out of bed or chair/Non-slip footwear when patient is out of bed/[place] to call system/Physically safe environment - no spills, clutter or unnecessary equipment/Purposeful Proactive Rounding/Room/bathroom lighting operational, light cord in reach Bed in lowest position, wheels locked, appropriate side rails in place/Call bell, personal items and telephone in reach/Instruct patient to call for assistance before getting out of bed or chair/Non-slip footwear when patient is out of bed/[place] to call system/Physically safe environment - no spills, clutter or unnecessary equipment/Purposeful Proactive Rounding/Room/bathroom lighting operational, light cord in reach

## 2023-12-18 ENCOUNTER — TRANSCRIPTION ENCOUNTER (OUTPATIENT)
Age: 33
End: 2023-12-18

## 2023-12-18 LAB
T PALLIDUM AB TITR SER: NEGATIVE — SIGNIFICANT CHANGE UP
T PALLIDUM AB TITR SER: NEGATIVE — SIGNIFICANT CHANGE UP

## 2023-12-18 RX ORDER — OXYCODONE HYDROCHLORIDE 5 MG/1
5 TABLET ORAL ONCE
Refills: 0 | Status: DISCONTINUED | OUTPATIENT
Start: 2023-12-18 | End: 2023-12-20

## 2023-12-18 RX ORDER — DIPHENHYDRAMINE HCL 50 MG
25 CAPSULE ORAL ONCE
Refills: 0 | Status: COMPLETED | OUTPATIENT
Start: 2023-12-18 | End: 2023-12-18

## 2023-12-18 RX ORDER — MAGNESIUM HYDROXIDE 400 MG/1
30 TABLET, CHEWABLE ORAL
Refills: 0 | Status: DISCONTINUED | OUTPATIENT
Start: 2023-12-18 | End: 2023-12-20

## 2023-12-18 RX ORDER — HYDROCORTISONE 1 %
1 OINTMENT (GRAM) TOPICAL EVERY 6 HOURS
Refills: 0 | Status: DISCONTINUED | OUTPATIENT
Start: 2023-12-18 | End: 2023-12-20

## 2023-12-18 RX ORDER — PRAMOXINE HYDROCHLORIDE 150 MG/15G
1 AEROSOL, FOAM RECTAL EVERY 4 HOURS
Refills: 0 | Status: DISCONTINUED | OUTPATIENT
Start: 2023-12-18 | End: 2023-12-20

## 2023-12-18 RX ORDER — OXYCODONE HYDROCHLORIDE 5 MG/1
5 TABLET ORAL
Refills: 0 | Status: DISCONTINUED | OUTPATIENT
Start: 2023-12-18 | End: 2023-12-20

## 2023-12-18 RX ORDER — BENZOCAINE 10 %
1 GEL (GRAM) MUCOUS MEMBRANE EVERY 6 HOURS
Refills: 0 | Status: DISCONTINUED | OUTPATIENT
Start: 2023-12-18 | End: 2023-12-20

## 2023-12-18 RX ORDER — ACETAMINOPHEN 500 MG
975 TABLET ORAL
Refills: 0 | Status: DISCONTINUED | OUTPATIENT
Start: 2023-12-18 | End: 2023-12-20

## 2023-12-18 RX ORDER — TETANUS TOXOID, REDUCED DIPHTHERIA TOXOID AND ACELLULAR PERTUSSIS VACCINE, ADSORBED 5; 2.5; 8; 8; 2.5 [IU]/.5ML; [IU]/.5ML; UG/.5ML; UG/.5ML; UG/.5ML
0.5 SUSPENSION INTRAMUSCULAR ONCE
Refills: 0 | Status: DISCONTINUED | OUTPATIENT
Start: 2023-12-18 | End: 2023-12-20

## 2023-12-18 RX ORDER — KETOROLAC TROMETHAMINE 30 MG/ML
30 SYRINGE (ML) INJECTION ONCE
Refills: 0 | Status: DISCONTINUED | OUTPATIENT
Start: 2023-12-18 | End: 2023-12-18

## 2023-12-18 RX ORDER — DIBUCAINE 1 %
1 OINTMENT (GRAM) RECTAL EVERY 6 HOURS
Refills: 0 | Status: DISCONTINUED | OUTPATIENT
Start: 2023-12-18 | End: 2023-12-20

## 2023-12-18 RX ORDER — LANOLIN
1 OINTMENT (GRAM) TOPICAL EVERY 6 HOURS
Refills: 0 | Status: DISCONTINUED | OUTPATIENT
Start: 2023-12-18 | End: 2023-12-20

## 2023-12-18 RX ORDER — SIMETHICONE 80 MG/1
80 TABLET, CHEWABLE ORAL EVERY 4 HOURS
Refills: 0 | Status: DISCONTINUED | OUTPATIENT
Start: 2023-12-18 | End: 2023-12-20

## 2023-12-18 RX ORDER — OXYTOCIN 10 UNIT/ML
41.67 VIAL (ML) INJECTION
Qty: 20 | Refills: 0 | Status: DISCONTINUED | OUTPATIENT
Start: 2023-12-18 | End: 2023-12-20

## 2023-12-18 RX ORDER — DIPHENHYDRAMINE HCL 50 MG
25 CAPSULE ORAL EVERY 6 HOURS
Refills: 0 | Status: DISCONTINUED | OUTPATIENT
Start: 2023-12-18 | End: 2023-12-20

## 2023-12-18 RX ORDER — SODIUM CHLORIDE 9 MG/ML
3 INJECTION INTRAMUSCULAR; INTRAVENOUS; SUBCUTANEOUS EVERY 8 HOURS
Refills: 0 | Status: DISCONTINUED | OUTPATIENT
Start: 2023-12-18 | End: 2023-12-20

## 2023-12-18 RX ORDER — IBUPROFEN 200 MG
600 TABLET ORAL EVERY 6 HOURS
Refills: 0 | Status: DISCONTINUED | OUTPATIENT
Start: 2023-12-18 | End: 2023-12-20

## 2023-12-18 RX ORDER — IBUPROFEN 200 MG
600 TABLET ORAL EVERY 6 HOURS
Refills: 0 | Status: COMPLETED | OUTPATIENT
Start: 2023-12-18 | End: 2024-11-15

## 2023-12-18 RX ORDER — AER TRAVELER 0.5 G/1
1 SOLUTION RECTAL; TOPICAL EVERY 4 HOURS
Refills: 0 | Status: DISCONTINUED | OUTPATIENT
Start: 2023-12-18 | End: 2023-12-20

## 2023-12-18 RX ADMIN — SODIUM CHLORIDE 3 MILLILITER(S): 9 INJECTION INTRAMUSCULAR; INTRAVENOUS; SUBCUTANEOUS at 14:00

## 2023-12-18 RX ADMIN — Medication 1000 MILLIUNIT(S)/MIN: at 10:44

## 2023-12-18 RX ADMIN — Medication 108 GRAM(S): at 08:24

## 2023-12-18 RX ADMIN — Medication 975 MILLIGRAM(S): at 20:48

## 2023-12-18 RX ADMIN — Medication 30 MILLIGRAM(S): at 10:50

## 2023-12-18 RX ADMIN — Medication 600 MILLIGRAM(S): at 17:45

## 2023-12-18 RX ADMIN — Medication 25 MILLIGRAM(S): at 05:18

## 2023-12-18 RX ADMIN — Medication 125 MILLIUNIT(S)/MIN: at 11:01

## 2023-12-18 RX ADMIN — Medication 108 GRAM(S): at 03:53

## 2023-12-18 RX ADMIN — SODIUM CHLORIDE 3 MILLILITER(S): 9 INJECTION INTRAMUSCULAR; INTRAVENOUS; SUBCUTANEOUS at 21:01

## 2023-12-18 RX ADMIN — Medication 975 MILLIGRAM(S): at 12:50

## 2023-12-18 NOTE — DISCHARGE NOTE OB - CARE PLAN
Principal Discharge DX:	Vaginal delivery  Assessment and plan of treatment:	1) Please take Ibuprofen and Tylenol as needed for pain control  2) Nothing in the vagina for 6 weeks (including no sex, no tampons, and no douching).  3) Please call your doctor for a follow up your postpartum appointment in 4-6 weeks.  4) Please continue taking vitamins postpartum.   5) Please call the office sooner if you have heavy vaginal bleeding, severe abdominal pain, or fever > 100.4F.  6) You may resume regular activity as tolerated  Secondary Diagnosis:	Status post bilateral salpingectomy  Assessment and plan of treatment:	Patient had bilateral salpingectomy completed for desire for sterilization. She should plan to make an appointment with the office within 1 week for an incision check and 6 weeks for postpartum follow-up. Postpartum precautions were given at the time of discharge.   1

## 2023-12-18 NOTE — DISCHARGE NOTE OB - PLAN OF CARE
1) Please take Ibuprofen and Tylenol as needed for pain control  2) Nothing in the vagina for 6 weeks (including no sex, no tampons, and no douching).  3) Please call your doctor for a follow up your postpartum appointment in 4-6 weeks.  4) Please continue taking vitamins postpartum.   5) Please call the office sooner if you have heavy vaginal bleeding, severe abdominal pain, or fever > 100.4F.  6) You may resume regular activity as tolerated Patient had bilateral salpingectomy completed for desire for sterilization. She should plan to make an appointment with the office within 1 week for an incision check and 6 weeks for postpartum follow-up. Postpartum precautions were given at the time of discharge.

## 2023-12-18 NOTE — OB PROVIDER LABOR PROGRESS NOTE - NS_OBIHIFHRDETAILS_OBGYN_ALL_OB_FT
145bpm, +accels, no decels mod variability
baseline 145, moderate variability, -accels, -decels
145bpm, +accels, no decels, mod variability

## 2023-12-18 NOTE — DISCHARGE NOTE OB - NS MD DC FALL RISK RISK
For information on Fall & Injury Prevention, visit: [email or web address] OR  [email or web address] OR  https://www.cdc.gov/steadi/patient.html For information on Fall & Injury Prevention, visit: [email or web address] OR  [email or web address] OR  https://www.cdc.gov/steadi/patient.html

## 2023-12-18 NOTE — DISCHARGE NOTE OB - HOSPITAL COURSE
Patient underwent a normal spontaneous vaginal delivery. Postpartum patient had surgical bilateral salpingectomy. Post-partum course was uncomplicated. Pain is well controlled with PRN medication. She has no difficulty with ambulation, voiding, or PO intake. Lab values and vital signs are within normal limits prior to discharge.

## 2023-12-18 NOTE — DISCHARGE NOTE OB - MEDICATION SUMMARY - MEDICATIONS TO TAKE
I will START or STAY ON the medications listed below when I get home from the hospital:    ibuprofen 600 mg oral tablet  -- 1 tab(s) by mouth every 6 hours  -- Indication: For pain    Tylenol 325 mg oral tablet  -- 2 tab(s) by mouth every 6 hours  -- Indication: For pain    Prena1 oral capsule  -- 1 cap(s) by mouth once a day  -- Indication: For nutrition    ferrous sulfate 325 mg (65 mg elemental iron) oral delayed release tablet  -- 1 tab(s) by mouth 2 times a day   -- May discolor urine or feces.  Swallow whole.  Do not crush.    -- Indication: For nutrition     MiraLax oral powder for reconstitution  -- 17 gram(s) by mouth once a day   -- Dilute this medication with liquid before administration.  It is very important that you take or use this exactly as directed.  Do not skip doses or discontinue unless directed by your doctor.    -- Indication: For constipation

## 2023-12-18 NOTE — DISCHARGE NOTE OB - PATIENT PORTAL LINK FT
You can access the FollowMyHealth Patient Portal offered by [facility] by registering at the following website: [email or web address]. By joining "[employer]’s FollowMyHealth portal, you will also be able to view your health information using other applications (apps) compatible with our system. You can access the FollowMyHealth Patient Portal offered by [facility] by registering at the following website: [email or web address]. By joining [employer]’s FollowMyHealth portal, you will also be able to view your health information using other applications (apps) compatible with our system.

## 2023-12-18 NOTE — OB PROVIDER LABOR PROGRESS NOTE - NS_SUBJECTIVE/OBJECTIVE_OBGYN_ALL_OB_FT
Pt is feeling contractions and vaginal pressure
Resident to bedside for cervical exam.
S/p epidural. patient on pitocin. resident to bedside for cervical exam and AROM.

## 2023-12-18 NOTE — DISCHARGE NOTE OB - CARE PROVIDER_API CALL
Bushra Milligan  Obstetrics and Gynecology  [facility] [place] [address]  Phone: [phone]  Fax: [phone]  Follow Up Time:    Bushra Milligan  Obstetrics and Gynecology  [facility] [place] [address]  Phone: [phone]  Fax: [phone]  Follow Up Time:

## 2023-12-18 NOTE — OB PROVIDER LABOR PROGRESS NOTE - ASSESSMENT
Pt is s/p AROM  Making cervical change  S/p epidural  Continue pitocin  
-Patient requesting epidural  -Continue on pitocin  -Eventually plan to AROM when station is -1
-AROM'd -clear fluid  -4/70/-1 on exam  -Cat I FHT  -Continue active management with pitocin

## 2023-12-19 ENCOUNTER — TRANSCRIPTION ENCOUNTER (OUTPATIENT)
Age: 33
End: 2023-12-19

## 2023-12-19 ENCOUNTER — APPOINTMENT (OUTPATIENT)
Dept: ANTEPARTUM | Facility: CLINIC | Age: 33
End: 2023-12-19

## 2023-12-19 LAB
HCT VFR BLD CALC: 31.4 % — LOW (ref 34.5–45)
HCT VFR BLD CALC: 31.4 % — LOW (ref 34.5–45)
HGB BLD-MCNC: 10.7 G/DL — LOW (ref 11.5–15.5)
HGB BLD-MCNC: 10.7 G/DL — LOW (ref 11.5–15.5)
MCHC RBC-ENTMCNC: 28 PG — SIGNIFICANT CHANGE UP (ref 27–34)
MCHC RBC-ENTMCNC: 28 PG — SIGNIFICANT CHANGE UP (ref 27–34)
MCHC RBC-ENTMCNC: 34.1 GM/DL — SIGNIFICANT CHANGE UP (ref 32–36)
MCHC RBC-ENTMCNC: 34.1 GM/DL — SIGNIFICANT CHANGE UP (ref 32–36)
MCV RBC AUTO: 82.2 FL — SIGNIFICANT CHANGE UP (ref 80–100)
MCV RBC AUTO: 82.2 FL — SIGNIFICANT CHANGE UP (ref 80–100)
PLATELET # BLD AUTO: 181 K/UL — SIGNIFICANT CHANGE UP (ref 150–400)
PLATELET # BLD AUTO: 181 K/UL — SIGNIFICANT CHANGE UP (ref 150–400)
RBC # BLD: 3.82 M/UL — SIGNIFICANT CHANGE UP (ref 3.8–5.2)
RBC # BLD: 3.82 M/UL — SIGNIFICANT CHANGE UP (ref 3.8–5.2)
RBC # FLD: 14.5 % — SIGNIFICANT CHANGE UP (ref 10.3–14.5)
RBC # FLD: 14.5 % — SIGNIFICANT CHANGE UP (ref 10.3–14.5)
WBC # BLD: 8.09 K/UL — SIGNIFICANT CHANGE UP (ref 3.8–10.5)
WBC # BLD: 8.09 K/UL — SIGNIFICANT CHANGE UP (ref 3.8–10.5)
WBC # FLD AUTO: 8.09 K/UL — SIGNIFICANT CHANGE UP (ref 3.8–10.5)
WBC # FLD AUTO: 8.09 K/UL — SIGNIFICANT CHANGE UP (ref 3.8–10.5)

## 2023-12-19 PROCEDURE — 88302 TISSUE EXAM BY PATHOLOGIST: CPT | Mod: 26

## 2023-12-19 RX ORDER — OXYCODONE HYDROCHLORIDE 5 MG/1
5 TABLET ORAL
Refills: 0 | Status: DISCONTINUED | OUTPATIENT
Start: 2023-12-19 | End: 2023-12-19

## 2023-12-19 RX ORDER — NALOXONE HYDROCHLORIDE 4 MG/.1ML
0.1 SPRAY NASAL
Refills: 0 | Status: DISCONTINUED | OUTPATIENT
Start: 2023-12-19 | End: 2023-12-20

## 2023-12-19 RX ORDER — DEXAMETHASONE 0.5 MG/5ML
4 ELIXIR ORAL EVERY 6 HOURS
Refills: 0 | Status: DISCONTINUED | OUTPATIENT
Start: 2023-12-19 | End: 2023-12-20

## 2023-12-19 RX ORDER — SODIUM CHLORIDE 9 MG/ML
1000 INJECTION, SOLUTION INTRAVENOUS ONCE
Refills: 0 | Status: COMPLETED | OUTPATIENT
Start: 2023-12-19 | End: 2023-12-19

## 2023-12-19 RX ORDER — ACETAMINOPHEN 500 MG
2 TABLET ORAL
Qty: 56 | Refills: 0
Start: 2023-12-19 | End: 2023-12-25

## 2023-12-19 RX ORDER — IBUPROFEN 200 MG
1 TABLET ORAL
Qty: 28 | Refills: 0
Start: 2023-12-19 | End: 2023-12-25

## 2023-12-19 RX ORDER — ONDANSETRON 8 MG/1
4 TABLET, FILM COATED ORAL EVERY 6 HOURS
Refills: 0 | Status: DISCONTINUED | OUTPATIENT
Start: 2023-12-19 | End: 2023-12-20

## 2023-12-19 RX ORDER — CITRIC ACID/SODIUM CITRATE 300-500 MG
30 SOLUTION, ORAL ORAL ONCE
Refills: 0 | Status: COMPLETED | OUTPATIENT
Start: 2023-12-19 | End: 2023-12-19

## 2023-12-19 RX ADMIN — SODIUM CHLORIDE 1000 MILLILITER(S): 9 INJECTION, SOLUTION INTRAVENOUS at 15:54

## 2023-12-19 RX ADMIN — SODIUM CHLORIDE 3 MILLILITER(S): 9 INJECTION INTRAMUSCULAR; INTRAVENOUS; SUBCUTANEOUS at 12:50

## 2023-12-19 RX ADMIN — Medication 975 MILLIGRAM(S): at 03:32

## 2023-12-19 RX ADMIN — SODIUM CHLORIDE 3 MILLILITER(S): 9 INJECTION INTRAMUSCULAR; INTRAVENOUS; SUBCUTANEOUS at 22:07

## 2023-12-19 RX ADMIN — Medication 600 MILLIGRAM(S): at 20:15

## 2023-12-19 RX ADMIN — Medication 30 MILLILITER(S): at 15:54

## 2023-12-19 RX ADMIN — SODIUM CHLORIDE 3 MILLILITER(S): 9 INJECTION INTRAMUSCULAR; INTRAVENOUS; SUBCUTANEOUS at 06:16

## 2023-12-19 RX ADMIN — Medication 975 MILLIGRAM(S): at 23:11

## 2023-12-19 NOTE — OB RN INTRAOPERATIVE NOTE - NSSELHIDDEN_OBGYN_ALL_OB_FT
Subjective   Patient is a 19 y.o. female presenting with abdominal pain.   Abdominal Pain   Associated symptoms: vaginal discharge    Associated symptoms: no constipation, no diarrhea, no fatigue, no fever, no nausea, no vaginal bleeding and no vomiting      Patient is a 19-year-old white female chief complaint of lower abdominal pain intermittently for one week.  The patient reports she is been at [facility] on several occasions.  She was diagnosed with ovarian cystc and instructed to follow up with an OB GYN.  The patient reports trying to followup with her OB GYN but had an outstanding bill.  She has been trying to save money to pay him in order to get treated.    Last night, the patient was informed by her boyfriend that he has been cheating on her.  He has been diagnosed with an STD but he did not want to give her all the specifics so she is unsure exactly where she has been exposed to.  She does have a history of Trichomonas, Chlamydia, and pelvic inflammatory disease.  She has noticed increased vaginal discharge. She denies any fever, vomiting, or dysuria.    Review of Systems   Constitutional: Negative for activity change, appetite change, fatigue and fever.   HENT: Negative.    Eyes: Negative.    Respiratory: Negative.    Gastrointestinal: Positive for abdominal pain. Negative for constipation, diarrhea, nausea and vomiting.   Genitourinary: Positive for vaginal discharge. Negative for menstrual problem and vaginal bleeding.   Musculoskeletal: Negative.    Neurological: Negative.    Psychiatric/Behavioral: Negative.        Past Medical History:   Diagnosis Date   • Seizure    • Seizures        Allergies   Allergen Reactions   • Blueberry Flavor    • Contrast Dye Itching   • Toradol [Ketorolac Tromethamine]    • Tramadol Hives       Past Surgical History:   Procedure Laterality Date   • DILATATION AND CURETTAGE         Family History   Problem Relation Age of Onset   • Sickle cell anemia Father    • Diabetes  Maternal Uncle    • Cancer Mother    • Asthma Daughter    • Asthma Son        Social History     Social History   • Marital status: Single     Spouse name: N/A   • Number of children: N/A   • Years of education: N/A     Social History Main Topics   • Smoking status: Current Every Day Smoker     Packs/day: 0.50     Years: 7.00     Types: Cigarettes   • Smokeless tobacco: None      Comment: started at 12 years old   • Alcohol use No   • Drug use: Yes     Special: Marijuana      Comment: havent use in year.   • Sexual activity: Yes     Partners: Male     Birth control/ protection: Patch     Other Topics Concern   • None     Social History Narrative       Prior to Admission medications    Medication Sig Start Date End Date Taking? Authorizing Provider   cefaclor (CECLOR) 500 MG capsule Take 1 capsule by mouth 3 (Three) Times a Day. 2/19/17   Cherie Martines MD   cefuroxime (CEFTIN) 500 MG tablet Take 1 tablet by mouth 2 (Two) Times a Day. 3/10/17   Martha Hankins DO   HYDROcodone-acetaminophen (NORCO) 5-325 MG per tablet Take 1 tablet by mouth Every 4 (Four) Hours As Needed for moderate pain (4-6) for up to 12 doses. 10/20/16   MAKSIM Mendosa   ketorolac (TORADOL) 10 MG tablet Take 1 tablet by mouth Every 6 (Six) Hours As Needed for moderate pain (4-6). 2/19/17   Cherie Martines MD   lamoTRIgine (LaMICtal) 25 MG tablet Take 50 mg by mouth Daily.    Historical Provider, MD   LevETIRAcetam (KEPPRA PO) Take  by mouth.    Historical Provider, MD   ondansetron ODT (ZOFRAN-ODT) 4 MG disintegrating tablet Take 1 tablet by mouth Every 6 (Six) Hours As Needed for nausea or vomiting. 10/20/16   MAKSIM Mendosa   Oxycodone-Acetaminophen (PERCOCET)  MG per tablet Take 1 tablet by mouth Every 6 (Six) Hours As Needed for Moderate Pain (4-6) (pain). 6/20/17   Rosalie MUHAMMAD MD   oxyCODONE-acetaminophen (PERCOCET) 7.5-325 MG per tablet Take 1 tablet by mouth Every 4 (Four) Hours As Needed (pain).  "6/20/17   Rosalie MUHAMMAD MD   phenazopyridine (PYRIDIUM) 200 MG tablet Take 1 tablet by mouth 3 (Three) Times a Day As Needed for bladder spasms. 2/20/17   MAKSIM Vu   Prenatal MV-Min-Fe Fum-FA-DHA (PRENATAL 1 PO) Take 1 tablet by mouth Daily.    Historical Provider, MD   promethazine (PHENERGAN) 12.5 MG tablet Take 2 tablets by mouth Every 6 (Six) Hours As Needed for nausea or vomiting. 10/9/16   MAKSIM Denny       Medications   cefTRIAXone (ROCEPHIN) 1 g/100 mL 0.9% NS (MBP) (1 g Intravenous New Bag 11/7/17 1605)   azithromycin (ZITHROMAX) tablet 500 mg (not administered)       /76 (Patient Position: Sitting)  Pulse 71  Temp 98.2 °F (36.8 °C) (Temporal Artery )   Resp 14  Ht 65\" (165.1 cm)  Wt 190 lb (86.2 kg)  SpO2 98%  BMI 31.62 kg/m2      Objective   Physical Exam   Constitutional: She is oriented to person, place, and time. She appears well-developed and well-nourished.  Non-toxic appearance. No distress.   HENT:   Head: Normocephalic and atraumatic.   Nose: Nose normal.   Mouth/Throat: Uvula is midline, oropharynx is clear and moist and mucous membranes are normal.   Eyes: Conjunctivae, EOM and lids are normal. Pupils are equal, round, and reactive to light. Lids are everted and swept, no foreign bodies found.   Neck: Trachea normal, normal range of motion, full passive range of motion without pain and phonation normal. Neck supple. Normal carotid pulses and no JVD present. Carotid bruit is not present. No rigidity.   Cardiovascular: Normal rate, regular rhythm, normal heart sounds, intact distal pulses and normal pulses.    Pulmonary/Chest: Effort normal and breath sounds normal. No stridor. No apnea and no tachypnea. No respiratory distress.   Abdominal: Soft. Normal appearance, normal aorta and bowel sounds are normal. There is no hepatosplenomegaly. There is no tenderness. There is no rebound and no guarding. No hernia.   Genitourinary: Cervix exhibits motion " tenderness and discharge. Cervix exhibits no friability. Right adnexum displays no tenderness. Left adnexum displays no tenderness. No erythema or bleeding in the vagina. No foreign body in the vagina. No signs of injury around the vagina. Vaginal discharge found.   Musculoskeletal: Normal range of motion.       Vascular Status -  Her exam exhibits right foot vasculature normal. Her exam exhibits no right foot edema. Her exam exhibits left foot vasculature normal. Her exam exhibits no left foot edema.  Neurological: She is alert and oriented to person, place, and time. She has normal strength and normal reflexes. She displays normal reflexes. No cranial nerve deficit or sensory deficit. Gait normal. GCS eye subscore is 4. GCS verbal subscore is 5. GCS motor subscore is 6.   Skin: Skin is warm, dry and intact. She is not diaphoretic. No pallor.   Psychiatric: She has a normal mood and affect. Her speech is normal and behavior is normal.   Nursing note and vitals reviewed.      Procedures         Lab Results (last 24 hours)     Procedure Component Value Units Date/Time    CBC & Differential [[id number]] Collected:  11/07/17 1414    Specimen:  Blood Updated:  11/07/17 1425    Narrative:       The following orders were created for panel order CBC & Differential.  Procedure                               Abnormality         Status                     ---------                               -----------         ------                     CBC Auto Differential[[id number]]        Normal              Final result                 Please view results for these tests on the individual orders.    Comprehensive Metabolic Panel [[id number]]  (Abnormal) Collected:  11/07/17 1414    Specimen:  Blood from Arm, Right Updated:  11/07/17 1434     Glucose 120 (H) mg/dL      BUN 8 mg/dL      Creatinine 0.78 mg/dL      Sodium 143 mmol/L      Potassium 4.4 mmol/L      Chloride 103 mmol/L      CO2 26.0 mmol/L      Calcium 9.8 mg/dL      Total  Protein 7.9 g/dL      Albumin 4.60 g/dL      ALT (SGPT) 39 U/L      AST (SGOT) 28 U/L      Alkaline Phosphatase 77 U/L      Total Bilirubin 0.6 mg/dL      eGFR Non African Amer 93 mL/min/1.73      Globulin 3.3 gm/dL      A/G Ratio 1.4 g/dL      BUN/Creatinine Ratio 10.3     Anion Gap 14.0 (H) mmol/L     Urinalysis With / Culture If Indicated - Urine, Clean Catch [[id number]]  (Abnormal) Collected:  11/07/17 1414    Specimen:  Urine from Urine, Clean Catch Updated:  11/07/17 1432     Color, UA Dark Yellow (A)     Appearance, UA Cloudy (A)     pH, UA <=5.0     Specific Gravity, UA 1.029     Glucose, UA Negative     Ketones, UA Negative     Bilirubin, UA Negative     Blood, UA Negative     Protein, UA Negative     Leuk Esterase, UA Moderate (2+) (A)     Nitrite, UA Positive (A)     Urobilinogen, UA 0.2 E.U./dL    Pregnancy, Urine - Urine, Clean Catch [[id number]]  (Normal) Collected:  11/07/17 1414    Specimen:  Urine from Urine, Clean Catch Updated:  11/07/17 1427     HCG, Urine QL Negative    CBC Auto Differential [[id number]]  (Normal) Collected:  11/07/17 1414    Specimen:  Blood from Arm, Right Updated:  11/07/17 1425     WBC 7.93 10*3/mm3      RBC 4.91 10*6/mm3      Hemoglobin 15.4 g/dL      Hematocrit 44.1 %      MCV 89.8 fL      MCH 31.4 pg      MCHC 34.9 g/dL      RDW 13.3 %      RDW-SD 43.1 fl      MPV 11.9 fL      Platelets 280 10*3/mm3      Neutrophil % 56.1 %      Lymphocyte % 31.5 %      Monocyte % 7.7 %      Eosinophil % 3.8 %      Basophil % 0.5 %      Immature Grans % 0.4 %      Neutrophils, Absolute 4.45 10*3/mm3      Lymphocytes, Absolute 2.50 10*3/mm3      Monocytes, Absolute 0.61 10*3/mm3      Eosinophils, Absolute 0.30 10*3/mm3      Basophils, Absolute 0.04 10*3/mm3      Immature Grans, Absolute 0.03 10*3/mm3     Urine Culture - Urine, Urine, Clean Catch [[id number]] Collected:  11/07/17 1414    Specimen:  Urine from Urine, Clean Catch Updated:  11/07/17 1426    Urinalysis, Microscopic Only - Urine,  Clean Catch [[id number]]  (Abnormal) Collected:  11/07/17 1414    Specimen:  Urine from Urine, Clean Catch Updated:  11/07/17 1432     RBC, UA 3-5 (A) /HPF      WBC, UA Too Numerous to Count (A) /HPF      Bacteria, UA 4+ (A) /HPF      Squamous Epithelial Cells, UA 0-2 /HPF      Hyaline Casts, UA 7-12 /LPF      Methodology Automated Microscopy    Wet Prep, Genital - Swab, Vagina [[id number]]  (Abnormal) Collected:  11/07/17 1553    Specimen:  Swab from Vagina Updated:  11/07/17 1617     YEAST No yeast seen     HYPHAL ELEMENTS No Hyphal elements seen     WBC'S 2+ WBC's seen (A)     Clue Cells, Wet Prep No Clue cells seen     Trichomonas, Wet Prep 1+ Trichomonas seen (A)    Chlamydia trachomatis, Neisseria gonorrhoeae, PCR - Swab, Vagina [[id number]] Collected:  11/07/17 1553    Specimen:  Swab from Vagina Updated:  11/07/17 1558    Herpes Simplex Virus Culture - Swab, Cervix [[id number]] Collected:  11/07/17 1553    Specimen:  Swab from Cervix Updated:  11/07/17 1558          Us Non-ob Transvaginal    Result Date: 11/7/2017  Narrative: EXAMINATION: US NON-OB TRANSVAGINAL-  11/7/2017 3:45 PM EST  HISTORY: Lower abdominal pain. LMP 2 months ago. Negative pregnancy test.  Transvaginal grayscale, color-flow, and Doppler ultrasound evaluation.  Uterus = 10.4 x 5.2 x 6.2 cm.  Endometrium = 1.2 cm.  Right ovary = 4.3 x 2.1 x 2.6 cm.  Left ovary = 4.2 x 2.0 x 2.3 cm.  Appropriate Doppler arterial waveform obtained within each ovary.  No free fluid.  Small ovarian follicles with no cyst or mass.  Summary: 1. No abnormality is seen. This report was finalized on 11/07/2017 15:26 by Dr. Raffaele Mistry MD.      ED Course  ED Course          MDM    Final diagnoses:   Trichomonas vaginitis   PID (acute pelvic inflammatory disease)   UTI (urinary tract infection), uncomplicated          Thu-Denisha SHARMIN Lyn  11/07/17 8514     [NS_DeliveryAttending1_OBGYN_ALL_OB_FT:[email or web address]=],[NS_DeliveryAssist1_OBGYN_ALL_OB_FT:[id number]=],[NS_DeliveryRN_OBGYN_ALL_OB_FT:[id number]=] [NS_DeliveryAttending1_OBGYN_ALL_OB_FT:[email or web address]=],[NS_DeliveryAssist1_OBGYN_ALL_OB_FT:[id number]=],[NS_DeliveryRN_OBGYN_ALL_OB_FT:[id number]=]

## 2023-12-19 NOTE — OB RN INTRAOPERATIVE NOTE - NSOBSELHIDDEN_OBGYN_ALL_OB_FT
[NSOBAttendingProcedure1_OBGYN_ALL_OB_FT:[email or web address]=],[NSRNCirculatorProcedure1_OBGYN_ALL_OB_FT:[id number]=] [NSOBAttendingProcedure1_OBGYN_ALL_OB_FT:[email or web address]=],[NSRNCirculatorProcedure1_OBGYN_ALL_OB_FT:[id number]=]

## 2023-12-19 NOTE — BRIEF OPERATIVE NOTE - OPERATION/FINDINGS
Cornua and fibria noted billaterally. excised using ligasure. Bleeding minimal.     IVF 400cc  UOP 400cc  EBL 5cc

## 2023-12-20 VITALS
OXYGEN SATURATION: 96 % | TEMPERATURE: 98 F | RESPIRATION RATE: 18 BRPM | DIASTOLIC BLOOD PRESSURE: 66 MMHG | HEART RATE: 58 BPM | SYSTOLIC BLOOD PRESSURE: 103 MMHG

## 2023-12-20 LAB
HCT VFR BLD CALC: 32.3 % — LOW (ref 34.5–45)
HCT VFR BLD CALC: 32.3 % — LOW (ref 34.5–45)
HGB BLD-MCNC: 10.6 G/DL — LOW (ref 11.5–15.5)
HGB BLD-MCNC: 10.6 G/DL — LOW (ref 11.5–15.5)
MCHC RBC-ENTMCNC: 27.5 PG — SIGNIFICANT CHANGE UP (ref 27–34)
MCHC RBC-ENTMCNC: 27.5 PG — SIGNIFICANT CHANGE UP (ref 27–34)
MCHC RBC-ENTMCNC: 32.8 GM/DL — SIGNIFICANT CHANGE UP (ref 32–36)
MCHC RBC-ENTMCNC: 32.8 GM/DL — SIGNIFICANT CHANGE UP (ref 32–36)
MCV RBC AUTO: 83.7 FL — SIGNIFICANT CHANGE UP (ref 80–100)
MCV RBC AUTO: 83.7 FL — SIGNIFICANT CHANGE UP (ref 80–100)
PLATELET # BLD AUTO: 198 K/UL — SIGNIFICANT CHANGE UP (ref 150–400)
PLATELET # BLD AUTO: 198 K/UL — SIGNIFICANT CHANGE UP (ref 150–400)
RBC # BLD: 3.86 M/UL — SIGNIFICANT CHANGE UP (ref 3.8–5.2)
RBC # BLD: 3.86 M/UL — SIGNIFICANT CHANGE UP (ref 3.8–5.2)
RBC # FLD: 14.5 % — SIGNIFICANT CHANGE UP (ref 10.3–14.5)
RBC # FLD: 14.5 % — SIGNIFICANT CHANGE UP (ref 10.3–14.5)
WBC # BLD: 8.89 K/UL — SIGNIFICANT CHANGE UP (ref 3.8–10.5)
WBC # BLD: 8.89 K/UL — SIGNIFICANT CHANGE UP (ref 3.8–10.5)
WBC # FLD AUTO: 8.89 K/UL — SIGNIFICANT CHANGE UP (ref 3.8–10.5)
WBC # FLD AUTO: 8.89 K/UL — SIGNIFICANT CHANGE UP (ref 3.8–10.5)

## 2023-12-20 PROCEDURE — 86901 BLOOD TYPING SEROLOGIC RH(D): CPT

## 2023-12-20 PROCEDURE — 82962 GLUCOSE BLOOD TEST: CPT

## 2023-12-20 PROCEDURE — 90707 MMR VACCINE SC: CPT

## 2023-12-20 PROCEDURE — 86850 RBC ANTIBODY SCREEN: CPT

## 2023-12-20 PROCEDURE — T1013: CPT

## 2023-12-20 PROCEDURE — 86780 TREPONEMA PALLIDUM: CPT

## 2023-12-20 PROCEDURE — 85027 COMPLETE CBC AUTOMATED: CPT

## 2023-12-20 PROCEDURE — 36415 COLL VENOUS BLD VENIPUNCTURE: CPT

## 2023-12-20 PROCEDURE — 86900 BLOOD TYPING SEROLOGIC ABO: CPT

## 2023-12-20 PROCEDURE — 85025 COMPLETE CBC W/AUTO DIFF WBC: CPT

## 2023-12-20 PROCEDURE — 88302 TISSUE EXAM BY PATHOLOGIST: CPT

## 2023-12-20 RX ADMIN — Medication 0.5 MILLILITER(S): at 09:09

## 2023-12-20 RX ADMIN — Medication 975 MILLIGRAM(S): at 05:20

## 2023-12-20 RX ADMIN — Medication 600 MILLIGRAM(S): at 03:27

## 2023-12-20 RX ADMIN — SODIUM CHLORIDE 3 MILLILITER(S): 9 INJECTION INTRAMUSCULAR; INTRAVENOUS; SUBCUTANEOUS at 05:31

## 2023-12-20 RX ADMIN — Medication 600 MILLIGRAM(S): at 09:08

## 2023-12-20 NOTE — PROGRESS NOTE ADULT - ATTENDING COMMENTS
post  day # 2  s/p BTL day # 1  no complaints  exam wnl  labs reviewed    cleared for dc  discussed vaccination, breastfeeding  follow up for post partum visit

## 2023-12-20 NOTE — PROGRESS NOTE ADULT - ASSESSMENT
A/P:  SRINATH ALONZO is a 33y  now PPD#1 s/p spontaneous vaginal delivery at 39w GA for low Raymundo-A. Uncomplicated. Pending PP tubal procedure today. Pt has been NPO since midnight.   -Vital signs stable  -Hgb: 12.8 -> AM labs pending   -Voiding, tolerating PO  -Advance care as tolerated   -Continue routine postpartum care and education  -Healthy female infant  -Dispo: Anticipate discharge to home tomorrow pending attending approval.  
A/P:  SRINATH ALONZO is a 33y  now PPD#2 s/p spontaneous vaginal delivery at 39w GA for low Raymundo-A. Uncomplicated. S/p pp tubal.   -Vital signs stable  -Hgb: 12.8 -> 10.7  -Voiding, tolerating PO  -Advance care as tolerated   -Continue routine postpartum care and education  -Healthy female infant  -Dispo: Anticipate discharge to home today pending attending approval.

## 2023-12-20 NOTE — PROGRESS NOTE ADULT - SUBJECTIVE AND OBJECTIVE BOX
SRINATH ALONZO is a 33y  now PPD#2 s/p spontaneous vaginal delivery at 39w GA for low Raymundo-A. Uncomplicated. S/p pp tubal.     S:    No acute events overnight.   The patient has no complaints.  Pain controlled with current treatment regimen.   She is ambulating without difficulty and tolerating PO.   + flatus/-BM/+ voiding   She endorses appropriate lochia, which is decreasing.   She is breastfeeding without difficulty.   She denies fevers, chills, nausea and vomiting.   She denies lightheadedness, dizziness, palpitations, chest pain and SOB.     O:    IVital Signs Last 24 Hrs  T(C): 36.8 (20 Dec 2023 04:21), Max: 37.1 (20 Dec 2023 00:35)  T(F): 98.3 (20 Dec 2023 04:21), Max: 98.7 (20 Dec 2023 00:35)  HR: 58 (20 Dec 2023 04:21) (56 - 84)  BP: 103/66 (20 Dec 2023 04:21) (95/59 - 155/88)-  RR: 18 (20 Dec 2023 04:21) (16 - 18)  SpO2: 96% (20 Dec 2023 04:21) (96% - 100%)    Gen: NAD, AOx3, resting comfortably on room air   Abdomen:  Soft, non-tender, non-distended  Uterus:  Fundus firm below umbilicus  VE:  Expected lochia  Ext:  b/l LE non-tender                             10.7   8.09  )-----------( 181      ( 19 Dec 2023 05:20 )             31.4                           12.8   8.60  )-----------( 221      ( 17 Dec 2023 15:20 )             37.8           
SRINATH ALONZO is a 33y  now PPD#1 s/p spontaneous vaginal delivery at 39w GA for low Raymundo-A. Uncomplicated. Pending PP tubal procedure today. Pt has been NPO since midnight.     S:    No acute events overnight.   The patient has no complaints.  Pain controlled with current treatment regimen.   She is ambulating without difficulty and tolerating PO.   + flatus/-BM/+ voiding   She endorses appropriate lochia, which is decreasing.   She is breastfeeding without difficulty.   She denies fevers, chills, nausea and vomiting.   She denies lightheadedness, dizziness, palpitations, chest pain and SOB.     O:    T(C): 36.8 (23 @ 20:00), Max: 37.1 (23 @ 10:27)  HR: 85 (23 @ 20:00) (71 - 99)  BP: 101/62 (23 @ 20:00) (97/58 - 160/62)  RR: 16 (23 @ 20:00) (16 - 18)  SpO2: 98% (23 @ 20:00) (96% - 99%)    Gen: NAD, AOx3, resting comfortably on room air   Abdomen:  Soft, non-tender, non-distended  Uterus:  Fundus firm below umbilicus  VE:  Expected lochia  Ext:  b/l LE non-tender                           12.8   8.60  )-----------( 221      ( 17 Dec 2023 15:20 )             37.8

## 2023-12-31 LAB
SURGICAL PATHOLOGY STUDY: SIGNIFICANT CHANGE UP
SURGICAL PATHOLOGY STUDY: SIGNIFICANT CHANGE UP

## 2024-11-13 NOTE — OB RN DELIVERY SUMMARY - NS_PLACENT[facility]_OBGYN_ALL_OB_DT
----- Message from Quynh Anderson MD sent at 11/13/2024  7:24 AM CST -----  Labs show  Elevated cholesterol, will continue to monitor  Working on diet and exercise, to maintain healthy weight   No diabetes  Immunity still there for hep b and varicella, no need to vaccinate   Rest labs normal      27-May-2020 13:08

## 2025-01-08 ENCOUNTER — APPOINTMENT (OUTPATIENT)
Dept: CARDIOLOGY | Facility: CLINIC | Age: 35
End: 2025-01-08
Payer: COMMERCIAL

## 2025-01-08 VITALS
HEIGHT: 64 IN | WEIGHT: 238 LBS | BODY MASS INDEX: 40.63 KG/M2 | DIASTOLIC BLOOD PRESSURE: 84 MMHG | SYSTOLIC BLOOD PRESSURE: 122 MMHG | HEART RATE: 78 BPM | OXYGEN SATURATION: 98 %

## 2025-01-08 VITALS — DIASTOLIC BLOOD PRESSURE: 80 MMHG | SYSTOLIC BLOOD PRESSURE: 126 MMHG

## 2025-01-08 DIAGNOSIS — R00.2 PALPITATIONS: ICD-10-CM

## 2025-01-08 DIAGNOSIS — Z71.82 EXERCISE COUNSELING: ICD-10-CM

## 2025-01-08 DIAGNOSIS — R42 DIZZINESS AND GIDDINESS: ICD-10-CM

## 2025-01-08 PROCEDURE — 93000 ELECTROCARDIOGRAM COMPLETE: CPT

## 2025-01-08 PROCEDURE — 99204 OFFICE O/P NEW MOD 45 MIN: CPT | Mod: 25

## 2025-01-22 ENCOUNTER — APPOINTMENT (OUTPATIENT)
Dept: CARDIOLOGY | Facility: CLINIC | Age: 35
End: 2025-01-22
Payer: COMMERCIAL

## 2025-01-22 PROCEDURE — 93306 TTE W/DOPPLER COMPLETE: CPT

## 2025-01-29 ENCOUNTER — APPOINTMENT (OUTPATIENT)
Dept: CARDIOLOGY | Facility: CLINIC | Age: 35
End: 2025-01-29
Payer: COMMERCIAL

## 2025-01-29 VITALS
DIASTOLIC BLOOD PRESSURE: 80 MMHG | BODY MASS INDEX: 40.63 KG/M2 | SYSTOLIC BLOOD PRESSURE: 118 MMHG | OXYGEN SATURATION: 98 % | HEART RATE: 71 BPM | HEIGHT: 64 IN | WEIGHT: 238 LBS

## 2025-01-29 DIAGNOSIS — R00.2 PALPITATIONS: ICD-10-CM

## 2025-01-29 DIAGNOSIS — R42 DIZZINESS AND GIDDINESS: ICD-10-CM

## 2025-01-29 DIAGNOSIS — Z71.82 EXERCISE COUNSELING: ICD-10-CM

## 2025-01-29 PROCEDURE — 99213 OFFICE O/P EST LOW 20 MIN: CPT

## 2025-03-13 ENCOUNTER — APPOINTMENT (OUTPATIENT)
Dept: NEUROLOGY | Facility: CLINIC | Age: 35
End: 2025-03-13
Payer: COMMERCIAL

## 2025-03-13 VITALS
SYSTOLIC BLOOD PRESSURE: 117 MMHG | DIASTOLIC BLOOD PRESSURE: 80 MMHG | WEIGHT: 240 LBS | BODY MASS INDEX: 40.97 KG/M2 | HEIGHT: 64 IN

## 2025-03-13 DIAGNOSIS — Z78.9 OTHER SPECIFIED HEALTH STATUS: ICD-10-CM

## 2025-03-13 DIAGNOSIS — R42 DIZZINESS AND GIDDINESS: ICD-10-CM

## 2025-03-13 PROCEDURE — G2211 COMPLEX E/M VISIT ADD ON: CPT | Mod: NC

## 2025-03-13 PROCEDURE — 99204 OFFICE O/P NEW MOD 45 MIN: CPT

## 2025-06-19 ENCOUNTER — APPOINTMENT (OUTPATIENT)
Dept: NEUROLOGY | Facility: CLINIC | Age: 35
End: 2025-06-19